# Patient Record
Sex: MALE | Race: WHITE | NOT HISPANIC OR LATINO | URBAN - METROPOLITAN AREA
[De-identification: names, ages, dates, MRNs, and addresses within clinical notes are randomized per-mention and may not be internally consistent; named-entity substitution may affect disease eponyms.]

---

## 2019-03-18 ENCOUNTER — OUTPATIENT (OUTPATIENT)
Dept: OUTPATIENT SERVICES | Facility: HOSPITAL | Age: 64
LOS: 1 days | Discharge: HOME | End: 2019-03-18

## 2019-03-18 DIAGNOSIS — M54.2 CERVICALGIA: ICD-10-CM

## 2019-04-09 PROBLEM — Z00.00 ENCOUNTER FOR PREVENTIVE HEALTH EXAMINATION: Status: ACTIVE | Noted: 2019-04-09

## 2019-05-07 ENCOUNTER — APPOINTMENT (OUTPATIENT)
Dept: SURGERY | Facility: CLINIC | Age: 64
End: 2019-05-07
Payer: COMMERCIAL

## 2019-05-07 VITALS — HEIGHT: 66 IN | BODY MASS INDEX: 30.05 KG/M2 | WEIGHT: 187 LBS

## 2019-05-07 PROCEDURE — 99243 OFF/OP CNSLTJ NEW/EST LOW 30: CPT

## 2019-05-09 NOTE — CONSULT LETTER
[Dear  ___] : Dear  [unfilled], [Please see my note below.] : Please see my note below. [Consult Closing:] : Thank you very much for allowing me to participate in the care of this patient.  If you have any questions, please do not hesitate to contact me. [Courtesy Letter:] : I had the pleasure of seeing your patient, [unfilled], in my office today. [DrFrancis  ___] : Dr. NEWSOME [FreeTextEntry3] : Respectfully,\par \par Bin Rinaldi M.D., FACS\par

## 2019-05-09 NOTE — PHYSICAL EXAM
[Normal Breath Sounds] : Normal breath sounds [No Rash or Lesion] : No rash or lesion [Calm] : calm [Alert] : alert [JVD] : no jugular venous distention  [de-identified] : overweight [de-identified] : normal [de-identified] : mildly protuberant abdomen\par  [de-identified] : normal testicles [de-identified] : large incarcerated right inguinal scrotal hernia

## 2019-05-09 NOTE — ASSESSMENT
[FreeTextEntry1] : Reymundo is a pleasant 64-year-old  with a past medical history significant for hypertension, GERD, glaucoma and degenerative cervical disc disease presenting to the office with his wife Tori (who is a nurse manager at [employer]) with concerns about intermittent pain and swelling in the right groin suspicious for a hernia. He also does heavy lifting and strenuous activity at work.\par \par Physical examination demonstrates a large protuberant bulge in the right groin extending deep into his right hemiscrotum which is not completely reducible consistent with an incarcerated right inguinal scrotal hernia warranting surgical repair. There is no evidence of strangulation and the patient denies any symptoms of obstruction. Examination of his left groin demonstrates some mild weakness but no obvious hernia. Both testicles are normal. His umbilical examination is unremarkable. He is significantly overweight with a current BMI of 30.\par \par I explained the pros and cons of surgery, as well as all risks, benefits, indications and alternatives of the procedure and the patient understood and agreed. He has a family trip scheduled to [facility] in early August and would prefer to undergo his hernia repair prior to that time. Reymundo was scheduled for the repair of his large incarcerated right inguinal scrotal hernia with mesh on Friday, June 7, 2019 under local with IV sedation at [facility] with presurgical testing waived.  The patient was encouraged to avoid heavy lifting and strenuous activity in the interim, of course.\par \par Of note, he had a recent cardiac evaluation due to a family history of coronary disease and his stress echo was normal with a normal ejection fraction. He does take baby aspirin and he may remain on his baby aspirin throughout the perioperative period.

## 2019-05-10 ENCOUNTER — TRANSCRIPTION ENCOUNTER (OUTPATIENT)
Age: 64
End: 2019-05-10

## 2019-06-07 ENCOUNTER — APPOINTMENT (OUTPATIENT)
Dept: SURGERY | Facility: AMBULATORY SURGERY CENTER | Age: 64
End: 2019-06-07
Payer: COMMERCIAL

## 2019-06-07 ENCOUNTER — OUTPATIENT (OUTPATIENT)
Dept: OUTPATIENT SERVICES | Facility: HOSPITAL | Age: 64
LOS: 1 days | Discharge: HOME | End: 2019-06-07

## 2019-06-07 VITALS
SYSTOLIC BLOOD PRESSURE: 131 MMHG | HEIGHT: 66 IN | RESPIRATION RATE: 18 BRPM | WEIGHT: 186.95 LBS | OXYGEN SATURATION: 98 % | TEMPERATURE: 98 F | DIASTOLIC BLOOD PRESSURE: 72 MMHG | HEART RATE: 50 BPM

## 2019-06-07 VITALS
OXYGEN SATURATION: 100 % | HEART RATE: 60 BPM | DIASTOLIC BLOOD PRESSURE: 56 MMHG | RESPIRATION RATE: 18 BRPM | SYSTOLIC BLOOD PRESSURE: 116 MMHG

## 2019-06-07 DIAGNOSIS — Z98.52 VASECTOMY STATUS: Chronic | ICD-10-CM

## 2019-06-07 PROCEDURE — 49507 PRP I/HERN INIT BLOCK >5 YR: CPT | Mod: RT

## 2019-06-07 RX ORDER — TRAMADOL HYDROCHLORIDE 50 MG/1
1 TABLET ORAL
Qty: 30 | Refills: 0
Start: 2019-06-07 | End: 2019-06-11

## 2019-06-07 RX ORDER — HYDROMORPHONE HYDROCHLORIDE 2 MG/ML
0.5 INJECTION INTRAMUSCULAR; INTRAVENOUS; SUBCUTANEOUS
Refills: 0 | Status: DISCONTINUED | OUTPATIENT
Start: 2019-06-07 | End: 2019-06-07

## 2019-06-07 RX ORDER — MORPHINE SULFATE 50 MG/1
2 CAPSULE, EXTENDED RELEASE ORAL
Refills: 0 | Status: DISCONTINUED | OUTPATIENT
Start: 2019-06-07 | End: 2019-06-07

## 2019-06-07 RX ORDER — OXYCODONE AND ACETAMINOPHEN 5; 325 MG/1; MG/1
1 TABLET ORAL ONCE
Refills: 0 | Status: DISCONTINUED | OUTPATIENT
Start: 2019-06-07 | End: 2019-06-07

## 2019-06-07 RX ORDER — ONDANSETRON 8 MG/1
4 TABLET, FILM COATED ORAL ONCE
Refills: 0 | Status: DISCONTINUED | OUTPATIENT
Start: 2019-06-07 | End: 2019-06-22

## 2019-06-07 RX ORDER — SODIUM CHLORIDE 9 MG/ML
1000 INJECTION, SOLUTION INTRAVENOUS
Refills: 0 | Status: DISCONTINUED | OUTPATIENT
Start: 2019-06-07 | End: 2019-06-22

## 2019-06-07 RX ADMIN — SODIUM CHLORIDE 100 MILLILITER(S): 9 INJECTION, SOLUTION INTRAVENOUS at 10:06

## 2019-06-07 NOTE — ASU DISCHARGE PLAN (ADULT/PEDIATRIC) - CALL YOUR DOCTOR IF YOU HAVE ANY OF THE FOLLOWING:
Bleeding that does not stop Bleeding that does not stop/Swelling that gets worse/Unable to urinate/Pain not relieved by Medications/Fever greater than (need to indicate Fahrenheit or Celsius)/Wound/Surgical Site with redness, or foul smelling discharge or pus

## 2019-06-07 NOTE — BRIEF OPERATIVE NOTE - NSICDXBRIEFPROCEDURE_GEN_ALL_CORE_FT
PROCEDURES:  Open repair of incarcerated inguinal hernia using mesh in adult 07-Jun-2019 09:08:12 RIGHT SCROTAL Bin Rinaldi

## 2019-06-07 NOTE — CHART NOTE - NSCHARTNOTEFT_GEN_A_CORE
PACU ANESTHESIA PACU ADMISSION NOTE      Procedure:Open repair of incarcerated inguinal hernia using mesh in adult: RIGHT SCROTAL    Post op diagnosisIncarcerated right inguinal hernia      ____ Intubated  TV:______       Rate: ______      FiO2: ______    ___x_ Patent Airway    ___x_ Full return of protective reflexes    ____ Full recovery from anesthesia / sedation to baseline status    Viitals:  see anesthesia record          Mental Status:  _x___ Awake   _____ Alert   _____ Drowsy   _____ Sedated    Nausea/Vomiting: ____ Yes, See Post - Op Orders      __x__ No    Pain Scale (0-10): _____    Treatment: ____ None    __x__ See Post - Op/PCA Orders    Post - Operative Fluids:   ____ Oral   __x__ See Post - Op Orders    Plan:         Discharge:   _x___Home       _____Floor         _____Critical Care    _____Other:_________________    Comments: uneventful perioperative course; no s/s anesthesia complications; d/c home when criteria met

## 2019-06-07 NOTE — PRE-ANESTHESIA EVALUATION ADULT - NSANTHOSAYNRD_GEN_A_CORE
No. ARRON screening performed.  STOP BANG Legend: 0-2 = LOW Risk; 3-4 = INTERMEDIATE Risk; 5-8 = HIGH Risk

## 2019-06-07 NOTE — ASU DISCHARGE PLAN (ADULT/PEDIATRIC) - CARE PROVIDER_API CALL
Bin Rinaldi)  Surgery  [address] [address]  [place] [address]  Phone: [phone]  Fax: [phone]  Follow Up Time: 1 week

## 2019-06-11 DIAGNOSIS — K40.30 UNILATERAL INGUINAL HERNIA, WITH OBSTRUCTION, WITHOUT GANGRENE, NOT SPECIFIED AS RECURRENT: ICD-10-CM

## 2019-06-11 DIAGNOSIS — E78.00 PURE HYPERCHOLESTEROLEMIA, UNSPECIFIED: ICD-10-CM

## 2019-06-11 DIAGNOSIS — I10 ESSENTIAL (PRIMARY) HYPERTENSION: ICD-10-CM

## 2019-06-11 DIAGNOSIS — E03.9 HYPOTHYROIDISM, UNSPECIFIED: ICD-10-CM

## 2019-06-11 DIAGNOSIS — K21.9 GASTRO-ESOPHAGEAL REFLUX DISEASE WITHOUT ESOPHAGITIS: ICD-10-CM

## 2019-06-11 DIAGNOSIS — Z88.0 ALLERGY STATUS TO PENICILLIN: ICD-10-CM

## 2019-06-18 ENCOUNTER — APPOINTMENT (OUTPATIENT)
Dept: SURGERY | Facility: CLINIC | Age: 64
End: 2019-06-18
Payer: COMMERCIAL

## 2019-06-18 DIAGNOSIS — K40.30 UNILATERAL INGUINAL HERNIA, WITH OBSTRUCTION, W/OUT GANGRENE, NOT SPECIFIED AS RECURRENT: ICD-10-CM

## 2019-06-18 PROCEDURE — 99024 POSTOP FOLLOW-UP VISIT: CPT

## 2019-06-18 NOTE — ASSESSMENT
[FreeTextEntry1] : Reymundo underwent the repair of his very large indirect incarcerated right inguinal scrotal hernia with mesh on June 7, 2019 under local with IV sedation without any problems or complications. His wound is clean, dry and intact. There is no evidence of erythema, seroma formation or infection. Surprisingly, he has minimal scrotal swelling. He is tolerating a diet and having normal bowel movements. He denies any significant postoperative pain or discomfort at this time.\par \par Reymundo and his wife Tori were counseled and reassured. He was discharged from the office with no specific followup necessary, but he knows to avoid any heavy lifting or strenuous activity for the next several weeks. We also discussed the importance of calorie restriction and healthy eating with regard to weight loss, hernia recurrence and one's overall health.

## 2019-06-18 NOTE — CONSULT LETTER
[FreeTextEntry1] : Dear Dr. Kaushal Hoyt, \par \par I had the pleasure of seeing your patient, MELBA MOON, in my office today. Please see my note below. \par \par Thank you very much for allowing me to participate in the care of this patient. If you have any questions, please do not hesitate to contact me. \par \par \par Respectfully,\par \par Bin Rinaldi M.D., FACS\par  \par \par \par cc: Dr. Bin Steele

## 2020-01-04 ENCOUNTER — INPATIENT (INPATIENT)
Facility: HOSPITAL | Age: 65
LOS: 1 days | Discharge: HOME | End: 2020-01-06
Attending: INTERNAL MEDICINE | Admitting: INTERNAL MEDICINE
Payer: COMMERCIAL

## 2020-01-04 VITALS
SYSTOLIC BLOOD PRESSURE: 125 MMHG | OXYGEN SATURATION: 97 % | RESPIRATION RATE: 18 BRPM | TEMPERATURE: 98 F | DIASTOLIC BLOOD PRESSURE: 76 MMHG | HEART RATE: 81 BPM

## 2020-01-04 DIAGNOSIS — Z98.52 VASECTOMY STATUS: Chronic | ICD-10-CM

## 2020-01-04 LAB
ALBUMIN SERPL ELPH-MCNC: 4.6 G/DL — SIGNIFICANT CHANGE UP (ref 3.5–5.2)
ALP SERPL-CCNC: 36 U/L — SIGNIFICANT CHANGE UP (ref 30–115)
ALT FLD-CCNC: 13 U/L — SIGNIFICANT CHANGE UP (ref 0–41)
ANION GAP SERPL CALC-SCNC: 22 MMOL/L — HIGH (ref 7–14)
APTT BLD: 26.8 SEC — LOW (ref 27–39.2)
AST SERPL-CCNC: 18 U/L — SIGNIFICANT CHANGE UP (ref 0–41)
BASOPHILS # BLD AUTO: 0.05 K/UL — SIGNIFICANT CHANGE UP (ref 0–0.2)
BASOPHILS NFR BLD AUTO: 0.6 % — SIGNIFICANT CHANGE UP (ref 0–1)
BILIRUB SERPL-MCNC: 0.7 MG/DL — SIGNIFICANT CHANGE UP (ref 0.2–1.2)
BUN SERPL-MCNC: 24 MG/DL — HIGH (ref 10–20)
CALCIUM SERPL-MCNC: 9.8 MG/DL — SIGNIFICANT CHANGE UP (ref 8.5–10.1)
CHLORIDE SERPL-SCNC: 95 MMOL/L — LOW (ref 98–110)
CO2 SERPL-SCNC: 21 MMOL/L — SIGNIFICANT CHANGE UP (ref 17–32)
CREAT SERPL-MCNC: 1.1 MG/DL — SIGNIFICANT CHANGE UP (ref 0.7–1.5)
EOSINOPHIL # BLD AUTO: 0.14 K/UL — SIGNIFICANT CHANGE UP (ref 0–0.7)
EOSINOPHIL NFR BLD AUTO: 1.8 % — SIGNIFICANT CHANGE UP (ref 0–8)
GLUCOSE SERPL-MCNC: 115 MG/DL — HIGH (ref 70–99)
HCT VFR BLD CALC: 39.2 % — LOW (ref 42–52)
HGB BLD-MCNC: 13.6 G/DL — LOW (ref 14–18)
IMM GRANULOCYTES NFR BLD AUTO: 0.4 % — HIGH (ref 0.1–0.3)
INR BLD: 0.99 RATIO — SIGNIFICANT CHANGE UP (ref 0.65–1.3)
LACTATE SERPL-SCNC: 2.5 MMOL/L — HIGH (ref 0.7–2)
LYMPHOCYTES # BLD AUTO: 1.79 K/UL — SIGNIFICANT CHANGE UP (ref 1.2–3.4)
LYMPHOCYTES # BLD AUTO: 22.9 % — SIGNIFICANT CHANGE UP (ref 20.5–51.1)
MCHC RBC-ENTMCNC: 32.2 PG — HIGH (ref 27–31)
MCHC RBC-ENTMCNC: 34.7 G/DL — SIGNIFICANT CHANGE UP (ref 32–37)
MCV RBC AUTO: 92.9 FL — SIGNIFICANT CHANGE UP (ref 80–94)
MONOCYTES # BLD AUTO: 0.45 K/UL — SIGNIFICANT CHANGE UP (ref 0.1–0.6)
MONOCYTES NFR BLD AUTO: 5.8 % — SIGNIFICANT CHANGE UP (ref 1.7–9.3)
NEUTROPHILS # BLD AUTO: 5.36 K/UL — SIGNIFICANT CHANGE UP (ref 1.4–6.5)
NEUTROPHILS NFR BLD AUTO: 68.5 % — SIGNIFICANT CHANGE UP (ref 42.2–75.2)
NRBC # BLD: 0 /100 WBCS — SIGNIFICANT CHANGE UP (ref 0–0)
PLATELET # BLD AUTO: 190 K/UL — SIGNIFICANT CHANGE UP (ref 130–400)
POTASSIUM SERPL-MCNC: 3.4 MMOL/L — LOW (ref 3.5–5)
POTASSIUM SERPL-SCNC: 3.4 MMOL/L — LOW (ref 3.5–5)
PROT SERPL-MCNC: 7.1 G/DL — SIGNIFICANT CHANGE UP (ref 6–8)
PROTHROM AB SERPL-ACNC: 11.4 SEC — SIGNIFICANT CHANGE UP (ref 9.95–12.87)
RBC # BLD: 4.22 M/UL — LOW (ref 4.7–6.1)
RBC # FLD: 11.6 % — SIGNIFICANT CHANGE UP (ref 11.5–14.5)
SODIUM SERPL-SCNC: 138 MMOL/L — SIGNIFICANT CHANGE UP (ref 135–146)
TROPONIN T SERPL-MCNC: <0.01 NG/ML — SIGNIFICANT CHANGE UP
WBC # BLD: 7.82 K/UL — SIGNIFICANT CHANGE UP (ref 4.8–10.8)
WBC # FLD AUTO: 7.82 K/UL — SIGNIFICANT CHANGE UP (ref 4.8–10.8)

## 2020-01-04 PROCEDURE — 93010 ELECTROCARDIOGRAM REPORT: CPT | Mod: 76

## 2020-01-04 PROCEDURE — 71045 X-RAY EXAM CHEST 1 VIEW: CPT | Mod: 26

## 2020-01-04 PROCEDURE — 99285 EMERGENCY DEPT VISIT HI MDM: CPT

## 2020-01-04 RX ORDER — SODIUM CHLORIDE 9 MG/ML
2000 INJECTION INTRAMUSCULAR; INTRAVENOUS; SUBCUTANEOUS ONCE
Refills: 0 | Status: COMPLETED | OUTPATIENT
Start: 2020-01-04 | End: 2020-01-04

## 2020-01-04 RX ORDER — POTASSIUM CHLORIDE 20 MEQ
20 PACKET (EA) ORAL ONCE
Refills: 0 | Status: COMPLETED | OUTPATIENT
Start: 2020-01-04 | End: 2020-01-04

## 2020-01-04 RX ADMIN — Medication 20 MILLIEQUIVALENT(S): at 23:45

## 2020-01-04 RX ADMIN — SODIUM CHLORIDE 1000 MILLILITER(S): 9 INJECTION INTRAMUSCULAR; INTRAVENOUS; SUBCUTANEOUS at 23:39

## 2020-01-04 NOTE — ED ADULT NURSE NOTE - CHIEF COMPLAINT QUOTE
BIBA with complaints of syncopal episode, witnessed by wife Cervical strain, acute, initial encounter

## 2020-01-04 NOTE — ED PROVIDER NOTE - CLINICAL SUMMARY MEDICAL DECISION MAKING FREE TEXT BOX
Patient was signed out to me (Dr. Mccormack) by Dr. Marin. 65yo M presents to ED s/p episode of unresponsiveness, description consistent with syncope in context of marijuana use. Pt has had similar in the past, was not evaluated at that time. Initial ED workup unremarkable including labs, CXR, EKG, CTH. Admitted for further eval of symptoms.

## 2020-01-04 NOTE — ED ADULT NURSE NOTE - OBJECTIVE STATEMENT
Wife reports pt had syncopal episode while in a chair, with unresponsiveness for approximately 20-30 seconds, she hit him on the chest a few time then he became responsive again, and was diaphoretic. Pt reports using marijuana tonight, reports he felt "high" before the episode, denies feeling chest pain, nausea, vomiting, sob before the episodes and only reports feeling sweaty after. Pt denies any complaints now. Labs drawn, IV established, and pt placed on continuous cardiac monitor with sinus rhythm.

## 2020-01-05 PROBLEM — E78.00 PURE HYPERCHOLESTEROLEMIA, UNSPECIFIED: Chronic | Status: ACTIVE | Noted: 2019-06-07

## 2020-01-05 PROBLEM — K21.9 GASTRO-ESOPHAGEAL REFLUX DISEASE WITHOUT ESOPHAGITIS: Chronic | Status: ACTIVE | Noted: 2019-06-07

## 2020-01-05 PROBLEM — E03.9 HYPOTHYROIDISM, UNSPECIFIED: Chronic | Status: ACTIVE | Noted: 2019-06-07

## 2020-01-05 PROBLEM — I10 ESSENTIAL (PRIMARY) HYPERTENSION: Chronic | Status: ACTIVE | Noted: 2019-06-07

## 2020-01-05 LAB
ALBUMIN SERPL ELPH-MCNC: 4.5 G/DL — SIGNIFICANT CHANGE UP (ref 3.5–5.2)
ALP SERPL-CCNC: 36 U/L — SIGNIFICANT CHANGE UP (ref 30–115)
ALT FLD-CCNC: 13 U/L — SIGNIFICANT CHANGE UP (ref 0–41)
ANION GAP SERPL CALC-SCNC: 19 MMOL/L — HIGH (ref 7–14)
AST SERPL-CCNC: 17 U/L — SIGNIFICANT CHANGE UP (ref 0–41)
BASE EXCESS BLDV CALC-SCNC: -1.7 MMOL/L — SIGNIFICANT CHANGE UP (ref -2–2)
BASOPHILS # BLD AUTO: 0.05 K/UL — SIGNIFICANT CHANGE UP (ref 0–0.2)
BASOPHILS NFR BLD AUTO: 0.9 % — SIGNIFICANT CHANGE UP (ref 0–1)
BILIRUB SERPL-MCNC: 0.9 MG/DL — SIGNIFICANT CHANGE UP (ref 0.2–1.2)
BUN SERPL-MCNC: 22 MG/DL — HIGH (ref 10–20)
CA-I SERPL-SCNC: 1.18 MMOL/L — SIGNIFICANT CHANGE UP (ref 1.12–1.3)
CALCIUM SERPL-MCNC: 9 MG/DL — SIGNIFICANT CHANGE UP (ref 8.5–10.1)
CHLORIDE SERPL-SCNC: 99 MMOL/L — SIGNIFICANT CHANGE UP (ref 98–110)
CO2 SERPL-SCNC: 20 MMOL/L — SIGNIFICANT CHANGE UP (ref 17–32)
CREAT SERPL-MCNC: 1.1 MG/DL — SIGNIFICANT CHANGE UP (ref 0.7–1.5)
EOSINOPHIL # BLD AUTO: 0.09 K/UL — SIGNIFICANT CHANGE UP (ref 0–0.7)
EOSINOPHIL NFR BLD AUTO: 1.6 % — SIGNIFICANT CHANGE UP (ref 0–8)
GAS PNL BLDV: 140 MMOL/L — SIGNIFICANT CHANGE UP (ref 136–145)
GAS PNL BLDV: SIGNIFICANT CHANGE UP
GLUCOSE SERPL-MCNC: 147 MG/DL — HIGH (ref 70–99)
HCO3 BLDV-SCNC: 22 MMOL/L — SIGNIFICANT CHANGE UP (ref 22–29)
HCT VFR BLD CALC: 36 % — LOW (ref 42–52)
HCT VFR BLDA CALC: 37.5 % — SIGNIFICANT CHANGE UP (ref 34–44)
HGB BLD CALC-MCNC: 12.2 G/DL — LOW (ref 14–18)
HGB BLD-MCNC: 12.3 G/DL — LOW (ref 14–18)
IMM GRANULOCYTES NFR BLD AUTO: 0.4 % — HIGH (ref 0.1–0.3)
LACTATE BLDV-MCNC: 1 MMOL/L — SIGNIFICANT CHANGE UP (ref 0.5–1.6)
LACTATE SERPL-SCNC: 1.3 MMOL/L — SIGNIFICANT CHANGE UP (ref 0.7–2)
LYMPHOCYTES # BLD AUTO: 1.03 K/UL — LOW (ref 1.2–3.4)
LYMPHOCYTES # BLD AUTO: 18.4 % — LOW (ref 20.5–51.1)
MAGNESIUM SERPL-MCNC: 1.5 MG/DL — LOW (ref 1.8–2.4)
MCHC RBC-ENTMCNC: 31.9 PG — HIGH (ref 27–31)
MCHC RBC-ENTMCNC: 34.2 G/DL — SIGNIFICANT CHANGE UP (ref 32–37)
MCV RBC AUTO: 93.3 FL — SIGNIFICANT CHANGE UP (ref 80–94)
MONOCYTES # BLD AUTO: 0.41 K/UL — SIGNIFICANT CHANGE UP (ref 0.1–0.6)
MONOCYTES NFR BLD AUTO: 7.3 % — SIGNIFICANT CHANGE UP (ref 1.7–9.3)
NEUTROPHILS # BLD AUTO: 3.99 K/UL — SIGNIFICANT CHANGE UP (ref 1.4–6.5)
NEUTROPHILS NFR BLD AUTO: 71.4 % — SIGNIFICANT CHANGE UP (ref 42.2–75.2)
NRBC # BLD: 0 /100 WBCS — SIGNIFICANT CHANGE UP (ref 0–0)
PCO2 BLDV: 35 MMHG — LOW (ref 41–51)
PH BLDV: 7.42 — SIGNIFICANT CHANGE UP (ref 7.26–7.43)
PLATELET # BLD AUTO: 187 K/UL — SIGNIFICANT CHANGE UP (ref 130–400)
PO2 BLDV: 49 MMHG — HIGH (ref 20–40)
POTASSIUM BLDV-SCNC: 3.8 MMOL/L — SIGNIFICANT CHANGE UP (ref 3.3–5.6)
POTASSIUM SERPL-MCNC: 3.9 MMOL/L — SIGNIFICANT CHANGE UP (ref 3.5–5)
POTASSIUM SERPL-SCNC: 3.9 MMOL/L — SIGNIFICANT CHANGE UP (ref 3.5–5)
PROT SERPL-MCNC: 6.7 G/DL — SIGNIFICANT CHANGE UP (ref 6–8)
RBC # BLD: 3.86 M/UL — LOW (ref 4.7–6.1)
RBC # FLD: 11.6 % — SIGNIFICANT CHANGE UP (ref 11.5–14.5)
SAO2 % BLDV: 85 % — SIGNIFICANT CHANGE UP
SODIUM SERPL-SCNC: 138 MMOL/L — SIGNIFICANT CHANGE UP (ref 135–146)
TROPONIN T SERPL-MCNC: <0.01 NG/ML — SIGNIFICANT CHANGE UP
TROPONIN T SERPL-MCNC: <0.01 NG/ML — SIGNIFICANT CHANGE UP
WBC # BLD: 5.59 K/UL — SIGNIFICANT CHANGE UP (ref 4.8–10.8)
WBC # FLD AUTO: 5.59 K/UL — SIGNIFICANT CHANGE UP (ref 4.8–10.8)

## 2020-01-05 PROCEDURE — 70450 CT HEAD/BRAIN W/O DYE: CPT | Mod: 26

## 2020-01-05 PROCEDURE — 93010 ELECTROCARDIOGRAM REPORT: CPT

## 2020-01-05 RX ORDER — MAGNESIUM SULFATE 500 MG/ML
2 VIAL (ML) INJECTION ONCE
Refills: 0 | Status: COMPLETED | OUTPATIENT
Start: 2020-01-05 | End: 2020-01-05

## 2020-01-05 RX ORDER — ENOXAPARIN SODIUM 100 MG/ML
40 INJECTION SUBCUTANEOUS DAILY
Refills: 0 | Status: DISCONTINUED | OUTPATIENT
Start: 2020-01-05 | End: 2020-01-06

## 2020-01-05 RX ORDER — HYDROCHLOROTHIAZIDE 25 MG
12.5 TABLET ORAL DAILY
Refills: 0 | Status: DISCONTINUED | OUTPATIENT
Start: 2020-01-05 | End: 2020-01-06

## 2020-01-05 RX ORDER — OLMESARTAN MEDOXOMIL-HYDROCHLOROTHIAZIDE 25; 40 MG/1; MG/1
1 TABLET, FILM COATED ORAL DAILY
Refills: 0 | Status: DISCONTINUED | OUTPATIENT
Start: 2020-01-06 | End: 2020-01-06

## 2020-01-05 RX ORDER — TIMOLOL 0.5 %
1 DROPS OPHTHALMIC (EYE)
Refills: 0 | Status: DISCONTINUED | OUTPATIENT
Start: 2020-01-05 | End: 2020-01-06

## 2020-01-05 RX ORDER — LEVOTHYROXINE SODIUM 125 MCG
175 TABLET ORAL DAILY
Refills: 0 | Status: DISCONTINUED | OUTPATIENT
Start: 2020-01-05 | End: 2020-01-06

## 2020-01-05 RX ORDER — LEVOTHYROXINE SODIUM 125 MCG
1 TABLET ORAL
Qty: 0 | Refills: 0 | DISCHARGE

## 2020-01-05 RX ORDER — TIMOLOL 0.5 %
1 DROPS OPHTHALMIC (EYE)
Qty: 0 | Refills: 0 | DISCHARGE

## 2020-01-05 RX ORDER — BIMATOPROST 0.3 MG/ML
1 SOLUTION/ DROPS OPHTHALMIC
Qty: 0 | Refills: 0 | DISCHARGE

## 2020-01-05 RX ORDER — ASPIRIN/CALCIUM CARB/MAGNESIUM 324 MG
81 TABLET ORAL DAILY
Refills: 0 | Status: DISCONTINUED | OUTPATIENT
Start: 2020-01-05 | End: 2020-01-06

## 2020-01-05 RX ORDER — PANTOPRAZOLE SODIUM 20 MG/1
40 TABLET, DELAYED RELEASE ORAL
Refills: 0 | Status: DISCONTINUED | OUTPATIENT
Start: 2020-01-05 | End: 2020-01-06

## 2020-01-05 RX ORDER — LATANOPROST 0.05 MG/ML
1 SOLUTION/ DROPS OPHTHALMIC; TOPICAL AT BEDTIME
Refills: 0 | Status: DISCONTINUED | OUTPATIENT
Start: 2020-01-05 | End: 2020-01-06

## 2020-01-05 RX ORDER — ATORVASTATIN CALCIUM 80 MG/1
1 TABLET, FILM COATED ORAL
Qty: 0 | Refills: 0 | DISCHARGE

## 2020-01-05 RX ORDER — ATORVASTATIN CALCIUM 80 MG/1
40 TABLET, FILM COATED ORAL AT BEDTIME
Refills: 0 | Status: DISCONTINUED | OUTPATIENT
Start: 2020-01-05 | End: 2020-01-06

## 2020-01-05 RX ORDER — OLMESARTAN MEDOXOMIL-HYDROCHLOROTHIAZIDE 25; 40 MG/1; MG/1
1 TABLET, FILM COATED ORAL
Qty: 0 | Refills: 0 | DISCHARGE

## 2020-01-05 RX ORDER — LOSARTAN POTASSIUM 100 MG/1
100 TABLET, FILM COATED ORAL DAILY
Refills: 0 | Status: DISCONTINUED | OUTPATIENT
Start: 2020-01-05 | End: 2020-01-06

## 2020-01-05 RX ADMIN — Medication 25 GRAM(S): at 16:50

## 2020-01-05 RX ADMIN — Medication 1 DROP(S): at 09:30

## 2020-01-05 RX ADMIN — PANTOPRAZOLE SODIUM 40 MILLIGRAM(S): 20 TABLET, DELAYED RELEASE ORAL at 06:34

## 2020-01-05 RX ADMIN — Medication 81 MILLIGRAM(S): at 12:24

## 2020-01-05 RX ADMIN — LOSARTAN POTASSIUM 100 MILLIGRAM(S): 100 TABLET, FILM COATED ORAL at 06:34

## 2020-01-05 RX ADMIN — ATORVASTATIN CALCIUM 40 MILLIGRAM(S): 80 TABLET, FILM COATED ORAL at 22:29

## 2020-01-05 RX ADMIN — Medication 12.5 MILLIGRAM(S): at 06:34

## 2020-01-05 RX ADMIN — ENOXAPARIN SODIUM 40 MILLIGRAM(S): 100 INJECTION SUBCUTANEOUS at 12:24

## 2020-01-05 RX ADMIN — Medication 175 MICROGRAM(S): at 06:34

## 2020-01-05 NOTE — H&P ADULT - NSHPPHYSICALEXAM_GEN_ALL_CORE
GENERAL: NAD, lying in bed comfortably  HEAD:  Atraumatic, Normocephalic  EYES: EOMI, PERRLA, conjunctiva and sclera clear  ENT: Moist mucous membranes  NECK: Supple, No JVD  CHEST/LUNG: Clear to auscultation bilaterally; No rales, rhonchi, wheezing, or rubs. Unlabored respirations  HEART: Regular rate and rhythm; No murmurs, rubs, or gallops  ABDOMEN: Bowel sounds present; Soft, Nontender, Nondistended. No hepatomegaly  EXTREMITIES:  + Peripheral Pulses, brisk capillary refill. No clubbing, cyanosis, or edema  NERVOUS SYSTEM:  Alert & Oriented X3, speech clear. No deficits   MSK: FROM all 4 extremities, full and equal strength  SKIN: No rashes or lesions

## 2020-01-05 NOTE — H&P ADULT - ASSESSMENT
63 y/o M with pmh of HTN, DLD, Dyslipidemia, GERD, hypothyroid presents with c/o Unresponsiveness for less than a minute. As per the wife who is a RN in this hospital, pt had some weed today, it was "strong stuff" and half an hour later the pt became unresponsive and diaphoretic.    # Episode of unresponsiveness after marijuana use  - pt does have some risk factors HTN, DLD, Hyothyroid  - check orthostatic VS, c/w tele  - CT Head -ve, check REEG as lactate was 2.5, f/u TSH, RPR  - pt can get 2D echo and carotid duplex done outpt  - potassium was 3.4 doubt arrythmia as the cause of this event, c/w tele for now  - pt counselled to stop smoking marijuana.    # Hypokalemia  - repleted f/u repeat in am    # HTN  - c/w losartan, HCTZ monitor lytes and renal function    # DLD  - c/w statins    # Hypotyroidism  - c/w synthroid, f/u TSH    DVT PPx: Lovenox 40 mg s/c  GI PPX: not indicated  Diet: DASH  Activity: Increase as tolerated  Dispo: from home, no needs, possible d/c in am if w/up is -ve  Code Status: full code

## 2020-01-05 NOTE — H&P ADULT - NSHPSOCIALHISTORY_GEN_ALL_CORE
denied hx of smoking, or alcohol use.  smokes marijuana every now and then, denied use of any other drugs.

## 2020-01-05 NOTE — H&P ADULT - NSHPLABSRESULTS_GEN_ALL_CORE
13.6   7.82  )-----------( 190      ( 04 Jan 2020 21:44 )             39.2       01-04    138  |  95<L>  |  24<H>  ----------------------------<  115<H>  3.4<L>   |  21  |  1.1    Ca    9.8      04 Jan 2020 21:44    TPro  7.1  /  Alb  4.6  /  TBili  0.7  /  DBili  x   /  AST  18  /  ALT  13  /  AlkPhos  36  01-04                  PT/INR - ( 04 Jan 2020 21:44 )   PT: 11.40 sec;   INR: 0.99 ratio         PTT - ( 04 Jan 2020 21:44 )  PTT:26.8 sec    Lactate Trend  01-04 @ 21:44 Lactate:2.5       CARDIAC MARKERS ( 04 Jan 2020 21:44 )  x     / <0.01 ng/mL / x     / x     / x            CAPILLARY BLOOD GLUCOSE        < from: CT Head No Cont (01.05.20 @ 02:27) >      Impression:     No CT evidence for acute intracranial pathology    < end of copied text >

## 2020-01-05 NOTE — H&P ADULT - HISTORY OF PRESENT ILLNESS
65 y/o M with pmh of HTN, DLD, Dyslipidemia, GERD, hypothyroid presents with c/o Unresponsiveness for less than a minute. As per the wife who is a RN in this hospital, pt had some weed today, it was "strong stuff" and half an hour later the pt became unresponsive and diaphoretic. It lasted for less than a minute but then pt had another similar episode so the family called EMS and pt was brought to ED. Pt said that he kind of dozed off and he does not remember what happened but he did not have any symptoms of chest pain, palpitations, lightheadedness, vertigo, urinary or fecal incontinence, or confusion after the episode. Pt attributes his symptoms to marijuana use that he started recently after nursing home. Pt had recent episodes of diarrhea with post nasal drip but now his symptoms are improving. Pt denied any hx of fever, chills, nausea, vomiting, constipation, melena, pain in abdomen, sob, chest pain, cough, increased urinary frequency, dysuria, headache, any changes in weight, recent travel, lightheadedness, dizziness, vertigo, localized weakness or numbness/tingling.

## 2020-01-05 NOTE — H&P ADULT - NSICDXPASTMEDICALHX_GEN_ALL_CORE_FT
PAST MEDICAL HISTORY:  GERD (gastroesophageal reflux disease)     High cholesterol     Hypertension     Hypothyroidism

## 2020-01-06 ENCOUNTER — TRANSCRIPTION ENCOUNTER (OUTPATIENT)
Age: 65
End: 2020-01-06

## 2020-01-06 VITALS
SYSTOLIC BLOOD PRESSURE: 132 MMHG | DIASTOLIC BLOOD PRESSURE: 76 MMHG | TEMPERATURE: 96 F | RESPIRATION RATE: 19 BRPM | HEART RATE: 50 BPM

## 2020-01-06 LAB
AMPHET UR-MCNC: NEGATIVE — SIGNIFICANT CHANGE UP
ANION GAP SERPL CALC-SCNC: 13 MMOL/L — SIGNIFICANT CHANGE UP (ref 7–14)
APPEARANCE UR: CLEAR — SIGNIFICANT CHANGE UP
BARBITURATES UR SCN-MCNC: NEGATIVE — SIGNIFICANT CHANGE UP
BASOPHILS # BLD AUTO: 0.04 K/UL — SIGNIFICANT CHANGE UP (ref 0–0.2)
BASOPHILS NFR BLD AUTO: 0.9 % — SIGNIFICANT CHANGE UP (ref 0–1)
BENZODIAZ UR-MCNC: NEGATIVE — SIGNIFICANT CHANGE UP
BILIRUB UR-MCNC: NEGATIVE — SIGNIFICANT CHANGE UP
BUN SERPL-MCNC: 22 MG/DL — HIGH (ref 10–20)
CALCIUM SERPL-MCNC: 9 MG/DL — SIGNIFICANT CHANGE UP (ref 8.5–10.1)
CHLORIDE SERPL-SCNC: 103 MMOL/L — SIGNIFICANT CHANGE UP (ref 98–110)
CO2 SERPL-SCNC: 25 MMOL/L — SIGNIFICANT CHANGE UP (ref 17–32)
COCAINE METAB.OTHER UR-MCNC: NEGATIVE — SIGNIFICANT CHANGE UP
COLOR SPEC: SIGNIFICANT CHANGE UP
CREAT SERPL-MCNC: 1.3 MG/DL — SIGNIFICANT CHANGE UP (ref 0.7–1.5)
DIFF PNL FLD: NEGATIVE — SIGNIFICANT CHANGE UP
EOSINOPHIL # BLD AUTO: 0.16 K/UL — SIGNIFICANT CHANGE UP (ref 0–0.7)
EOSINOPHIL NFR BLD AUTO: 3.4 % — SIGNIFICANT CHANGE UP (ref 0–8)
GLUCOSE SERPL-MCNC: 126 MG/DL — HIGH (ref 70–99)
GLUCOSE UR QL: NEGATIVE — SIGNIFICANT CHANGE UP
HCT VFR BLD CALC: 35 % — LOW (ref 42–52)
HGB BLD-MCNC: 12 G/DL — LOW (ref 14–18)
IMM GRANULOCYTES NFR BLD AUTO: 0.4 % — HIGH (ref 0.1–0.3)
KETONES UR-MCNC: NEGATIVE — SIGNIFICANT CHANGE UP
LEUKOCYTE ESTERASE UR-ACNC: NEGATIVE — SIGNIFICANT CHANGE UP
LYMPHOCYTES # BLD AUTO: 1.32 K/UL — SIGNIFICANT CHANGE UP (ref 1.2–3.4)
LYMPHOCYTES # BLD AUTO: 28.3 % — SIGNIFICANT CHANGE UP (ref 20.5–51.1)
MAGNESIUM SERPL-MCNC: 2 MG/DL — SIGNIFICANT CHANGE UP (ref 1.8–2.4)
MCHC RBC-ENTMCNC: 31.9 PG — HIGH (ref 27–31)
MCHC RBC-ENTMCNC: 34.3 G/DL — SIGNIFICANT CHANGE UP (ref 32–37)
MCV RBC AUTO: 93.1 FL — SIGNIFICANT CHANGE UP (ref 80–94)
METHADONE UR-MCNC: NEGATIVE — SIGNIFICANT CHANGE UP
MONOCYTES # BLD AUTO: 0.36 K/UL — SIGNIFICANT CHANGE UP (ref 0.1–0.6)
MONOCYTES NFR BLD AUTO: 7.7 % — SIGNIFICANT CHANGE UP (ref 1.7–9.3)
NEUTROPHILS # BLD AUTO: 2.76 K/UL — SIGNIFICANT CHANGE UP (ref 1.4–6.5)
NEUTROPHILS NFR BLD AUTO: 59.3 % — SIGNIFICANT CHANGE UP (ref 42.2–75.2)
NITRITE UR-MCNC: NEGATIVE — SIGNIFICANT CHANGE UP
NRBC # BLD: 0 /100 WBCS — SIGNIFICANT CHANGE UP (ref 0–0)
OPIATES UR-MCNC: NEGATIVE — SIGNIFICANT CHANGE UP
PCP SPEC-MCNC: SIGNIFICANT CHANGE UP
PH UR: 5.5 — SIGNIFICANT CHANGE UP (ref 5–8)
PLATELET # BLD AUTO: 170 K/UL — SIGNIFICANT CHANGE UP (ref 130–400)
POTASSIUM SERPL-MCNC: 4.4 MMOL/L — SIGNIFICANT CHANGE UP (ref 3.5–5)
POTASSIUM SERPL-SCNC: 4.4 MMOL/L — SIGNIFICANT CHANGE UP (ref 3.5–5)
PROPOXYPHENE QUALITATIVE URINE RESULT: NEGATIVE — SIGNIFICANT CHANGE UP
PROT UR-MCNC: NEGATIVE — SIGNIFICANT CHANGE UP
RBC # BLD: 3.76 M/UL — LOW (ref 4.7–6.1)
RBC # FLD: 11.7 % — SIGNIFICANT CHANGE UP (ref 11.5–14.5)
SODIUM SERPL-SCNC: 141 MMOL/L — SIGNIFICANT CHANGE UP (ref 135–146)
SP GR SPEC: 1.02 — SIGNIFICANT CHANGE UP (ref 1.01–1.02)
TSH SERPL-MCNC: 1.57 UIU/ML — SIGNIFICANT CHANGE UP (ref 0.27–4.2)
UROBILINOGEN FLD QL: SIGNIFICANT CHANGE UP
WBC # BLD: 4.66 K/UL — LOW (ref 4.8–10.8)
WBC # FLD AUTO: 4.66 K/UL — LOW (ref 4.8–10.8)

## 2020-01-06 RX ORDER — ASPIRIN/CALCIUM CARB/MAGNESIUM 324 MG
1 TABLET ORAL
Qty: 0 | Refills: 0 | DISCHARGE

## 2020-01-06 RX ADMIN — PANTOPRAZOLE SODIUM 40 MILLIGRAM(S): 20 TABLET, DELAYED RELEASE ORAL at 06:32

## 2020-01-06 RX ADMIN — Medication 1 DROP(S): at 06:32

## 2020-01-06 RX ADMIN — Medication 175 MICROGRAM(S): at 06:32

## 2020-01-06 NOTE — DISCHARGE NOTE PROVIDER - NSDCMRMEDTOKEN_GEN_ALL_CORE_FT
Benicar HCT 40 mg-12.5 mg oral tablet: 1 tab(s) orally once a day  Lipitor 40 mg oral tablet: 1 tab(s) orally once a day  Low Dose ASA 81 mg oral tablet: 1 tab(s) orally once a day  Lumigan 0.01% ophthalmic solution: 1 drop(s) to each affected eye once a day (in the evening)  omeprazole 20 mg oral delayed release tablet: 1 tab(s) orally once a day  Synthroid 175 mcg (0.175 mg) oral tablet: 1 tab(s) orally once a day  timolol hemihydrate 0.5% ophthalmic solution: 1 drop(s) to each affected eye 2 times a day Benicar HCT 40 mg-12.5 mg oral tablet: 1 tab(s) orally once a day  Lipitor 40 mg oral tablet: 1 tab(s) orally once a day  Lumigan 0.01% ophthalmic solution: 1 drop(s) to each affected eye once a day (in the evening)  omeprazole 20 mg oral delayed release tablet: 1 tab(s) orally once a day  Synthroid 175 mcg (0.175 mg) oral tablet: 1 tab(s) orally once a day  timolol hemihydrate 0.5% ophthalmic solution: 1 drop(s) to each affected eye 2 times a day

## 2020-01-06 NOTE — DISCHARGE NOTE NURSING/CASE MANAGEMENT/SOCIAL WORK - PATIENT PORTAL LINK FT
You can access the FollowMyHealth Patient Portal offered by [facility] by registering at the following website: [email or web address]. By joining [employer]’s FollowMyHealth portal, you will also be able to view your health information using other applications (apps) compatible with our system.

## 2020-01-06 NOTE — CONSULT NOTE ADULT - ASSESSMENT
1] Syncope - etiology probably due to Cannabis use  - No evidence of arrhythmia on telemetry disclosure  - No neurologic deficits  - CTSCAN Brain results noted.  - No further cardiac workup warrated    2] Hypertension  - Continue medications    3] Dyslipidemia  - Continue statin therapy    DVT prophylaxis    May go home from cardiac standpoint.  Plan discussed with House Staff and Nursing Staff    Plan discussed with patient and family at bedside.  He agrees with plan.    Bin Steele MD  [phone] Office

## 2020-01-06 NOTE — DISCHARGE NOTE PROVIDER - NSDCCPCAREPLAN_GEN_ALL_CORE_FT
PRINCIPAL DISCHARGE DIAGNOSIS  Diagnosis: Syncope  Assessment and Plan of Treatment: You presented to the ED for breif episode of non-responsiveness. CT scan did not show any pathology and cardiology have also cleared you for the discharge. This episode most likely occured because of cannabanoid use.  You are strongly advised to stop cannabanoid use in the future.      SECONDARY DISCHARGE DIAGNOSES  Diagnosis: Hypertension  Assessment and Plan of Treatment: continue taking your blood pressure pills. Check your blood pressure daily. I fits uncontrolled, F/U with your primary doctor

## 2020-01-06 NOTE — DISCHARGE NOTE PROVIDER - HOSPITAL COURSE
65 y/o M with pmh of HTN, DLD, Dyslipidemia, GERD, hypothyroid presents with c/o Unresponsiveness for less than a minute. As per the wife who is a RN in this hospital, pt had some weed today, it was "strong stuff" and half an hour later the pt became unresponsive and diaphoretic. It lasted for less than a minute but then pt had another similar episode so the family called EMS and pt was brought to ED. Pt said that he kind of dozed off and he does not remember what happened but he did not have any symptoms of chest pain, palpitations, lightheadedness, vertigo, urinary or fecal incontinence, or confusion after the episode. Pt attributes his symptoms to marijuana use that he started recently after group home. Pt had recent episodes of diarrhea with post nasal drip but now his symptoms are improving. Pt denied any hx of fever, chills, nausea, vomiting, constipation, melena, pain in abdomen, sob, chest pain, cough, increased urinary frequency, dysuria, headache, any changes in weight, recent travel, lightheadedness, dizziness, vertigo, localized weakness or numbness/tingling.         Syncope - etiology probably due to Cannabis use    - No evidence of arrhythmia on telemetry disclosure    - No neurologic deficits    - CTSCAN Brain did not show any changes    - No further cardiac workup warrated- as per cardiologist    - for HTN and DLD continue home meds.

## 2020-01-06 NOTE — DISCHARGE NOTE NURSING/CASE MANAGEMENT/SOCIAL WORK - NSDCPEWEB_GEN_ALL_CORE
[facility] for Tobacco Control website --- [email or web address] website --- [email or web address]

## 2020-01-06 NOTE — DISCHARGE NOTE NURSING/CASE MANAGEMENT/SOCIAL WORK - NSDCPEEMAIL_GEN_ALL_CORE
[facility] for Tobacco Control email [email or web address]

## 2020-01-06 NOTE — CONSULT NOTE ADULT - SUBJECTIVE AND OBJECTIVE BOX
Patient was seen and examined by me on 3C.  Wife and daughter are at the bedside.  EMR reviewed.    Patient is a 64y old  Male who presents with a chief complaint of syncope (06 Jan 2020 09:11)      REASON FOR CONSULT: Syncope    HPI:    Mr. Reymundo Vila is an 64-year-old  male with a past medical history of Hypertension, Dyslipidemia, GERD and Hypothyroidism.    He presented at [facility] because of unresponsiveness.  As per wife, patient smoked marijuana at around 11 am on day of admission.  A few minutes after smoking marijuana, patient was noticed to be unresponsive.  Wife who is a registered nurse quickly applied CPR on patient...   Wife states that patient quickly recovered consciousness in less than a minute.   As per wife, patient experienced a similar episode sometime in September or October 2019 after he also smoked some marijuana.       He denies any chest pain or shortness of breath prior to the episode last Saturday, Jan 4, 2020.  He feels fine in bed.  He has ambulated in the unit without any chest pain, shortness of breath or lightheadedness.    He had an normal stress echo within the last 12 months.  _______________________________________________      House Staff Admission Notes:  65 y/o M with pmh of HTN, DLD, Dyslipidemia, GERD, hypothyroid presents with c/o Unresponsiveness for less than a minute. As per the wife who is a RN in this hospital, pt had some weed today, it was "strong stuff" and half an hour later the pt became unresponsive and diaphoretic. It lasted for less than a minute but then pt had another similar episode so the family called EMS and pt was brought to ED. Pt said that he kind of dozed off and he does not remember what happened but he did not have any symptoms of chest pain, palpitations, lightheadedness, vertigo, urinary or fecal incontinence, or confusion after the episode. Pt attributes his symptoms to marijuana use that he started recently after [facility]. Pt had recent episodes of diarrhea with post nasal drip but now his symptoms are improving. Pt denied any hx of fever, chills, nausea, vomiting, constipation, melena, pain in abdomen, sob, chest pain, cough, increased urinary frequency, dysuria, headache, any changes in weight, recent travel, lightheadedness, dizziness, vertigo, localized weakness or numbness/tingling. (05 Jan 2020 04:45)      PAST MEDICAL & SURGICAL HISTORY:  GERD (gastroesophageal reflux disease)  Hypothyroidism  High cholesterol  Hypertension  H/O vasectomy          SOCIAL HISTORY:     FAMILY HISTORY:    penicillin (Hives)      MEDICATIONS  (STANDING):  aspirin  chewable 81 milliGRAM(s) Oral daily  atorvastatin 40 milliGRAM(s) Oral at bedtime  enoxaparin Injectable 40 milliGRAM(s) SubCutaneous daily  hydrochlorothiazide 12.5 milliGRAM(s) Oral daily  latanoprost 0.005% Ophthalmic Solution 1 Drop(s) Both EYES at bedtime  levothyroxine 175 MICROGram(s) Oral daily  losartan 100 milliGRAM(s) Oral daily  olmesartan 40 mg/hydrochlorothiazide 12.5 mg 1 Tablet(s) Oral daily  pantoprazole    Tablet 40 milliGRAM(s) Oral before breakfast  timolol 0.5% Solution 1 Drop(s) Both EYES two times a day    MEDICATIONS  (PRN):      Vital Signs Last 24 Hrs  T(C): 35.6 (06 Jan 2020 05:53), Max: 37 (05 Jan 2020 16:06)  T(F): 96 (06 Jan 2020 05:53), Max: 98.6 (05 Jan 2020 16:06)  HR: 50 (06 Jan 2020 05:53) (50 - 74)  BP: 132/76 (06 Jan 2020 05:53) (132/76 - 159/63)  BP(mean): --  RR: 19 (06 Jan 2020 05:53) (19 - 20)  SpO2: 98% (05 Jan 2020 16:06) (98% - 98%) I&O's Detail    PHYSICAL EXAM:  Not in distress  Alert, oriented x 3  No JVD; regular rhythm; nl S1S2  No carotid bruit  Bilateral breath sounds   Abdomen soft, no guarding  No edema.  No focal lateralizing neurologic deficits.      REVIEW OF SYSTEMS  CONSTITUTIONAL:  No night sweats.  No fatigue, malaise, lethargy.  No fever or chills.  HEENT:  Eyes:  No visual changes.  No eye pain.  No eye discharge.  ENT:  No runny nose.  No epistaxis.   No sore throat.  No odynophagia.  No ear pain.  No congestion.  RESPIRATORY:  No cough.  No wheeze.  No hemoptysis.    CARDIOVASCULAR:  No chest pains.  No palpitations.   GASTROINTESTINAL:  No abdominal pain.  No nausea or vomiting.  No diarrhea or constipation.  No hematemesis.  No hematochezia.  No melena.  GENITOURINARY:  No urgency.  No frequency.  No dysuria.  No hematuria.  No obstructive symptoms.  NMUSCULOSKELETAL:  No musculoskeletal pain.  No joint swelling.  No arthritis.  NEUROLOGICAL:    No headache or neck pain.  No syncope or seizure.   SKIN:  No rashes.    ENDOCRINE:  No unexplained weight loss.    HEMATOLOGIC:   No purpura.    ALLERGIC AND IMMUNOLOGIC:  No pruritus.  No swelling.       ECG: Sinus Rhythm  Telemetry: Sinus Rhythm.  No malignant tachyarrhythmia, no pauses.  No AFIB or Afluter.    RADIOLOGY & ADDITIONAL STUDIES:  CTSCAN  < from: CT Head No Cont (01.05.20 @ 02:27) >  Findings:     The ventricles, basal cisterns and sulcal pattern are prominent consistent with parenchymal volume loss.    Patchy hypodensities are seen in the periventricular and subcortical white matter, nonspecific and without mass-effect, and may represent areas of microvascular change.    Grey-white differentiation is preserved.    There is no acute mass effect, midline shift or intracranial hemorrhage.      The calvarium is intact.    Right maxillary mucous retention cysts/polyps. Bilateral mastoid complexes are unremarkable.     Impression:     No CT evidence for acute intracranial pathology            < end of copied text >          LABS:                        12.0   4.66  )-----------( 170      ( 06 Jan 2020 06:44 )             35.0     01-06    141  |  103  |  22<H>  ----------------------------<  126<H>  4.4   |  25  |  1.3    Ca    9.0      06 Jan 2020 06:44  Mg     2.0     01-06    TPro  6.7  /  Alb  4.5  /  TBili  0.9  /  DBili  x   /  AST  17  /  ALT  13  /  AlkPhos  36  01-05    CARDIAC MARKERS ( 05 Jan 2020 17:25 )  x     / <0.01 ng/mL / x     / x     / x      CARDIAC MARKERS ( 05 Jan 2020 11:10 )  x     / <0.01 ng/mL / x     / x     / x      CARDIAC MARKERS ( 04 Jan 2020 21:44 )  x     / <0.01 ng/mL / x     / x     / x          PT/INR - ( 04 Jan 2020 21:44 )   PT: 11.40 sec;   INR: 0.99 ratio         PTT - ( 04 Jan 2020 21:44 )  PTT:26.8 sec  I&O's Summary

## 2020-01-06 NOTE — DISCHARGE NOTE PROVIDER - CARE PROVIDER_API CALL
Bin Steele)  Cardiovascular Disease; Nuclear Cardiology  [address], [address]  [place] [address]  Phone: [phone]  Fax: [phone]  Follow Up Time: 1 month    Kaushal hoover  [address] [place] [address]  Phone: [phone]  Fax: (   )    -  Follow Up Time: 1 week

## 2020-01-06 NOTE — DISCHARGE NOTE PROVIDER - CARE PROVIDERS DIRECT ADDRESSES
[email or web address]

## 2020-01-06 NOTE — DISCHARGE NOTE PROVIDER - PROVIDER TOKENS
PROVIDER:[TOKEN:[[id number]],FOLLOWUP:[1 month]],FREE:[LAST:[maryk],FIRST:[Kaushal],PHONE:[phone]],FAX:[(   )    -],ADDRESS:[[address] [place] [address]],FOLLOWUP:[1 week]]

## 2020-01-06 NOTE — PROGRESS NOTE ADULT - SUBJECTIVE AND OBJECTIVE BOX
Patient was seen and examined. Spoke with wife and RN. Chart reviewed.  No events overnight. Feeling much better  Vital Signs Last 24 Hrs  T(F): 96 (2020 05:53), Max: 98.6 (2020 16:06)  HR: 50 (2020 05:53) (50 - 74)  BP: 132/76 (2020 05:53) (132/76 - 159/63)  SpO2: 98% (2020 16:06) (98% - 98%)  MEDICATIONS  (STANDING):  aspirin  chewable 81 milliGRAM(s) Oral daily  atorvastatin 40 milliGRAM(s) Oral at bedtime  enoxaparin Injectable 40 milliGRAM(s) SubCutaneous daily  hydrochlorothiazide 12.5 milliGRAM(s) Oral daily  latanoprost 0.005% Ophthalmic Solution 1 Drop(s) Both EYES at bedtime  levothyroxine 175 MICROGram(s) Oral daily  losartan 100 milliGRAM(s) Oral daily  olmesartan 40 mg/hydrochlorothiazide 12.5 mg 1 Tablet(s) Oral daily  pantoprazole    Tablet 40 milliGRAM(s) Oral before breakfast  timolol 0.5% Solution 1 Drop(s) Both EYES two times a day    MEDICATIONS  (PRN):    Labs:                        12.0   4.66  )-----------( 170      ( 2020 06:44 )             35.0                         12.3   5.59  )-----------( 187      ( 2020 11:10 )             36.0     2020 06:44    141    |  103    |  22     ----------------------------<  126    4.4     |  25     |  1.3    2020 11:10    138    |  99     |  22     ----------------------------<  147    3.9     |  20     |  1.1      Ca    9.0        2020 06:44  Ca    9.0        2020 11:10  Mg     2.0       2020 06:44  Mg     1.5       2020 11:10    TPro  6.7    /  Alb  4.5    /  TBili  0.9    /  DBili  x      /  AST  17     /  ALT  13     /  AlkPhos  36     2020 11:10  TPro  7.1    /  Alb  4.6    /  TBili  0.7    /  DBili  x      /  AST  18     /  ALT  13     /  AlkPhos  36     2020 21:44    PT/INR - ( 2020 21:44 )   PT: 11.40 sec;   INR: 0.99 ratio         PTT - ( 2020 21:44 )  PTT:26.8 sec  Urinalysis Basic - ( 2020 06:00 )    Color: Light Yellow / Appearance: Clear / S.016 / pH: x  Gluc: x / Ketone: Negative  / Bili: Negative / Urobili: <2 mg/dL   Blood: x / Protein: Negative / Nitrite: Negative   Leuk Esterase: Negative / RBC: x / WBC x   Sq Epi: x / Non Sq Epi: x / Bacteria: x        General: comfortable, NAD  Neurology: A&Ox3, nonfocal  Head:  Normocephalic, atraumatic  ENT:  Mucosa moist, no ulcerations  Neck:  Supple, no JVD,   Skin: no breakdowns (as per RN)  Resp: CTA B/L  CV: RRR, S1S2,   GI: Soft, NT, bowel sounds  MS: No edema, + peripheral pulses, FROM all 4 extremity      A/P:  63 y/o M with pmh of HTN, DLD, Dyslipidemia, GERD, hypothyroid presents with c/o Unresponsiveness for less than a minute after using marijuana; also recently had URI and taking zithromax- now feeling much better    had recent stress echo    cardio eval Dr Steele    tele    pt aware of need to stop marijuana    DC home today if cleared by cardiology     DVT prophylaxis  Decubitus prevention- all measures as per RN protocol  Please call or text me with any questions or updates

## 2020-01-07 LAB
HCV AB S/CO SERPL IA: 1.01 S/CO — HIGH (ref 0–0.99)
HCV AB SERPL-IMP: ABNORMAL
HCV RNA FLD QL NAA+PROBE: SIGNIFICANT CHANGE UP
HCV RNA SPEC QL PROBE+SIG AMP: SIGNIFICANT CHANGE UP

## 2020-01-09 DIAGNOSIS — E03.9 HYPOTHYROIDISM, UNSPECIFIED: ICD-10-CM
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
## 2020-01-09 DIAGNOSIS — R55 SYNCOPE AND COLLAPSE: ICD-10-CM

## 2020-01-09 DIAGNOSIS — F12.188 CANNABIS ABUSE WITH OTHER CANNABIS-INDUCED DISORDER: ICD-10-CM

## 2020-01-09 DIAGNOSIS — E78.00 PURE HYPERCHOLESTEROLEMIA, UNSPECIFIED: ICD-10-CM

## 2020-01-09 DIAGNOSIS — T40.7X1A POISONING BY CANNABIS (DERIVATIVES), ACCIDENTAL (UNINTENTIONAL), INITIAL ENCOUNTER: ICD-10-CM

## 2020-01-09 DIAGNOSIS — I10 ESSENTIAL (PRIMARY) HYPERTENSION: ICD-10-CM

## 2020-01-09 DIAGNOSIS — H40.9 UNSPECIFIED GLAUCOMA: ICD-10-CM

## 2020-01-09 DIAGNOSIS — Z87.891 PERSONAL HISTORY OF NICOTINE DEPENDENCE: ICD-10-CM

## 2020-01-09 DIAGNOSIS — I45.10 UNSPECIFIED RIGHT BUNDLE-BRANCH BLOCK: ICD-10-CM

## 2020-01-09 DIAGNOSIS — Z88.0 ALLERGY STATUS TO PENICILLIN: ICD-10-CM

## 2020-01-09 DIAGNOSIS — K21.9 GASTRO-ESOPHAGEAL REFLUX DISEASE WITHOUT ESOPHAGITIS: ICD-10-CM

## 2020-05-04 NOTE — PATIENT PROFILE ADULT - NSPROIMPLANTSMEDDEV_GEN_A_NUR
0930   Time Out 0830  1000   Timed Code Minutes   30   Individual Treatment Minutes  30 30   Co-Treatment Minutes      Group Treatment Minutes      Concurrent Treatment Minutes        TOTAL DAILY MINUTES:  60     Electronically Signed by     Session 82 Sylwia GONZALES [id number] 5/4/2020 8:46 AM  Speech-Language Pathologist    Session 2  Ivon Jones M.A.  [facility] [id number]  Speech-Language Pathologist [phone]  5/4/2020 8:46 AM None

## 2020-05-27 NOTE — ED ADULT NURSE NOTE - NSFALLRSKOUTCOME_ED_ALL_ED
resolved on repeat testing GANESH mckoy planned for tomorrow  send infectious workup from fluid studies Universal Safety Interventions

## 2021-04-10 ENCOUNTER — EMERGENCY (EMERGENCY)
Facility: HOSPITAL | Age: 66
LOS: 0 days | Discharge: HOME | End: 2021-04-10
Attending: EMERGENCY MEDICINE | Admitting: EMERGENCY MEDICINE
Payer: COMMERCIAL

## 2021-04-10 VITALS
RESPIRATION RATE: 16 BRPM | HEART RATE: 112 BPM | OXYGEN SATURATION: 97 % | SYSTOLIC BLOOD PRESSURE: 139 MMHG | DIASTOLIC BLOOD PRESSURE: 70 MMHG | WEIGHT: 199.96 LBS | HEIGHT: 66 IN | TEMPERATURE: 100 F

## 2021-04-10 VITALS
SYSTOLIC BLOOD PRESSURE: 159 MMHG | OXYGEN SATURATION: 100 % | TEMPERATURE: 99 F | DIASTOLIC BLOOD PRESSURE: 85 MMHG | HEART RATE: 96 BPM

## 2021-04-10 DIAGNOSIS — E78.00 PURE HYPERCHOLESTEROLEMIA, UNSPECIFIED: ICD-10-CM

## 2021-04-10 DIAGNOSIS — E03.9 HYPOTHYROIDISM, UNSPECIFIED: ICD-10-CM

## 2021-04-10 DIAGNOSIS — R00.0 TACHYCARDIA, UNSPECIFIED: ICD-10-CM

## 2021-04-10 DIAGNOSIS — R55 SYNCOPE AND COLLAPSE: ICD-10-CM

## 2021-04-10 DIAGNOSIS — Z98.52 VASECTOMY STATUS: Chronic | ICD-10-CM

## 2021-04-10 DIAGNOSIS — Z88.0 ALLERGY STATUS TO PENICILLIN: ICD-10-CM

## 2021-04-10 DIAGNOSIS — Z20.822 CONTACT WITH AND (SUSPECTED) EXPOSURE TO COVID-19: ICD-10-CM

## 2021-04-10 DIAGNOSIS — I10 ESSENTIAL (PRIMARY) HYPERTENSION: ICD-10-CM

## 2021-04-10 DIAGNOSIS — E86.0 DEHYDRATION: ICD-10-CM

## 2021-04-10 DIAGNOSIS — R50.9 FEVER, UNSPECIFIED: ICD-10-CM

## 2021-04-10 DIAGNOSIS — F17.200 NICOTINE DEPENDENCE, UNSPECIFIED, UNCOMPLICATED: ICD-10-CM

## 2021-04-10 DIAGNOSIS — K21.9 GASTRO-ESOPHAGEAL REFLUX DISEASE WITHOUT ESOPHAGITIS: ICD-10-CM

## 2021-04-10 DIAGNOSIS — Z79.899 OTHER LONG TERM (CURRENT) DRUG THERAPY: ICD-10-CM

## 2021-04-10 LAB
ALBUMIN SERPL ELPH-MCNC: 4.4 G/DL — SIGNIFICANT CHANGE UP (ref 3.5–5.2)
ALP SERPL-CCNC: 35 U/L — SIGNIFICANT CHANGE UP (ref 30–115)
ALT FLD-CCNC: 23 U/L — SIGNIFICANT CHANGE UP (ref 0–41)
ANION GAP SERPL CALC-SCNC: 14 MMOL/L — SIGNIFICANT CHANGE UP (ref 7–14)
APPEARANCE UR: CLEAR — SIGNIFICANT CHANGE UP
AST SERPL-CCNC: 22 U/L — SIGNIFICANT CHANGE UP (ref 0–41)
BASOPHILS # BLD AUTO: 0.03 K/UL — SIGNIFICANT CHANGE UP (ref 0–0.2)
BASOPHILS NFR BLD AUTO: 0.3 % — SIGNIFICANT CHANGE UP (ref 0–1)
BILIRUB SERPL-MCNC: 0.6 MG/DL — SIGNIFICANT CHANGE UP (ref 0.2–1.2)
BILIRUB UR-MCNC: NEGATIVE — SIGNIFICANT CHANGE UP
BUN SERPL-MCNC: 19 MG/DL — SIGNIFICANT CHANGE UP (ref 10–20)
CALCIUM SERPL-MCNC: 9.2 MG/DL — SIGNIFICANT CHANGE UP (ref 8.5–10.1)
CHLORIDE SERPL-SCNC: 96 MMOL/L — LOW (ref 98–110)
CO2 SERPL-SCNC: 21 MMOL/L — SIGNIFICANT CHANGE UP (ref 17–32)
COLOR SPEC: YELLOW — SIGNIFICANT CHANGE UP
CREAT SERPL-MCNC: 1.2 MG/DL — SIGNIFICANT CHANGE UP (ref 0.7–1.5)
DIFF PNL FLD: NEGATIVE — SIGNIFICANT CHANGE UP
EOSINOPHIL # BLD AUTO: 0.03 K/UL — SIGNIFICANT CHANGE UP (ref 0–0.7)
EOSINOPHIL NFR BLD AUTO: 0.3 % — SIGNIFICANT CHANGE UP (ref 0–8)
GLUCOSE SERPL-MCNC: 107 MG/DL — HIGH (ref 70–99)
GLUCOSE UR QL: NEGATIVE — SIGNIFICANT CHANGE UP
HCT VFR BLD CALC: 36.8 % — LOW (ref 42–52)
HGB BLD-MCNC: 13.3 G/DL — LOW (ref 14–18)
IMM GRANULOCYTES NFR BLD AUTO: 0.7 % — HIGH (ref 0.1–0.3)
KETONES UR-MCNC: ABNORMAL
LACTATE SERPL-SCNC: 2 MMOL/L — SIGNIFICANT CHANGE UP (ref 0.7–2)
LEUKOCYTE ESTERASE UR-ACNC: NEGATIVE — SIGNIFICANT CHANGE UP
LYMPHOCYTES # BLD AUTO: 0.52 K/UL — LOW (ref 1.2–3.4)
LYMPHOCYTES # BLD AUTO: 4.5 % — LOW (ref 20.5–51.1)
MCHC RBC-ENTMCNC: 31.5 PG — HIGH (ref 27–31)
MCHC RBC-ENTMCNC: 36.1 G/DL — SIGNIFICANT CHANGE UP (ref 32–37)
MCV RBC AUTO: 87.2 FL — SIGNIFICANT CHANGE UP (ref 80–94)
MONOCYTES # BLD AUTO: 0.69 K/UL — HIGH (ref 0.1–0.6)
MONOCYTES NFR BLD AUTO: 6 % — SIGNIFICANT CHANGE UP (ref 1.7–9.3)
NEUTROPHILS # BLD AUTO: 10.11 K/UL — HIGH (ref 1.4–6.5)
NEUTROPHILS NFR BLD AUTO: 88.2 % — HIGH (ref 42.2–75.2)
NITRITE UR-MCNC: NEGATIVE — SIGNIFICANT CHANGE UP
NRBC # BLD: 0 /100 WBCS — SIGNIFICANT CHANGE UP (ref 0–0)
PH UR: 5.5 — SIGNIFICANT CHANGE UP (ref 5–8)
PLATELET # BLD AUTO: 192 K/UL — SIGNIFICANT CHANGE UP (ref 130–400)
POTASSIUM SERPL-MCNC: 4.3 MMOL/L — SIGNIFICANT CHANGE UP (ref 3.5–5)
POTASSIUM SERPL-SCNC: 4.3 MMOL/L — SIGNIFICANT CHANGE UP (ref 3.5–5)
PROT SERPL-MCNC: 7 G/DL — SIGNIFICANT CHANGE UP (ref 6–8)
PROT UR-MCNC: SIGNIFICANT CHANGE UP
RBC # BLD: 4.22 M/UL — LOW (ref 4.7–6.1)
RBC # FLD: 11.6 % — SIGNIFICANT CHANGE UP (ref 11.5–14.5)
SARS-COV-2 RNA SPEC QL NAA+PROBE: SIGNIFICANT CHANGE UP
SODIUM SERPL-SCNC: 131 MMOL/L — LOW (ref 135–146)
SP GR SPEC: 1.02 — SIGNIFICANT CHANGE UP (ref 1.01–1.03)
TROPONIN T SERPL-MCNC: <0.01 NG/ML — SIGNIFICANT CHANGE UP
UROBILINOGEN FLD QL: SIGNIFICANT CHANGE UP
WBC # BLD: 11.46 K/UL — HIGH (ref 4.8–10.8)
WBC # FLD AUTO: 11.46 K/UL — HIGH (ref 4.8–10.8)

## 2021-04-10 PROCEDURE — 71045 X-RAY EXAM CHEST 1 VIEW: CPT | Mod: 26

## 2021-04-10 PROCEDURE — 93010 ELECTROCARDIOGRAM REPORT: CPT

## 2021-04-10 PROCEDURE — 70450 CT HEAD/BRAIN W/O DYE: CPT | Mod: 26

## 2021-04-10 PROCEDURE — 99285 EMERGENCY DEPT VISIT HI MDM: CPT

## 2021-04-10 RX ORDER — SODIUM CHLORIDE 9 MG/ML
500 INJECTION INTRAMUSCULAR; INTRAVENOUS; SUBCUTANEOUS ONCE
Refills: 0 | Status: COMPLETED | OUTPATIENT
Start: 2021-04-10 | End: 2021-04-10

## 2021-04-10 RX ORDER — SODIUM CHLORIDE 9 MG/ML
1000 INJECTION INTRAMUSCULAR; INTRAVENOUS; SUBCUTANEOUS ONCE
Refills: 0 | Status: COMPLETED | OUTPATIENT
Start: 2021-04-10 | End: 2021-04-10

## 2021-04-10 RX ORDER — ACETAMINOPHEN 500 MG
650 TABLET ORAL ONCE
Refills: 0 | Status: COMPLETED | OUTPATIENT
Start: 2021-04-10 | End: 2021-04-10

## 2021-04-10 RX ADMIN — Medication 650 MILLIGRAM(S): at 12:29

## 2021-04-10 RX ADMIN — SODIUM CHLORIDE 1000 MILLILITER(S): 9 INJECTION INTRAMUSCULAR; INTRAVENOUS; SUBCUTANEOUS at 10:14

## 2021-04-10 RX ADMIN — SODIUM CHLORIDE 500 MILLILITER(S): 9 INJECTION INTRAMUSCULAR; INTRAVENOUS; SUBCUTANEOUS at 11:39

## 2021-04-10 NOTE — ED PROVIDER NOTE - OBJECTIVE STATEMENT
66 y male with PMH of HTN HLD hypothyroidism GERD presents s/p fall from standing after getting up from the toilet 1 hour PTA.  +HT hit back of head on way down.  patient admitted in jan for syncopal episode and found to have PVC's on EKG.  Triage EKG today shows sinus tach to 115 with PVC.  patient also reports recent fevers for the past 2 days Tmax 100.3.  Denies CP, SOB, Dizziness, leg swelling, N/V abd pain.

## 2021-04-10 NOTE — ED ADULT NURSE NOTE - INTERVENTIONS DEFINITIONS
[place] to call system/Call bell, personal items and telephone within reach/Instruct patient to call for assistance/Non-slip footwear when patient is off stretcher/Physically safe environment: no spills, clutter or unnecessary equipment/Stretcher in lowest position, wheels locked, appropriate side rails in place/Monitor gait and stability

## 2021-04-10 NOTE — ED ADULT TRIAGE NOTE - CHIEF COMPLAINT QUOTE
"I was walking, got sweaty, and felt weak and fell. I hit my head." Pt denies anticoagulation/antiplatelet medication.

## 2021-04-10 NOTE — ED PROVIDER NOTE - PATIENT PORTAL LINK FT
You can access the FollowMyHealth Patient Portal offered by [facility] by registering at the following website: [email or web address]. By joining [employer]’s FollowMyHealth portal, you will also be able to view your health information using other applications (apps) compatible with our system.

## 2021-04-10 NOTE — ED PROVIDER NOTE - PHYSICAL EXAMINATION
PHYSICAL EXAM: I have reviewed current vital signs.  GENERAL: NAD, well-nourished; well-developed.  HEAD:  Normocephalic, atraumatic.  EYES: EOMI, PERRL, conjunctiva and sclera clear.  ENT: MMM, no erythema/exudates.  NECK: Supple, no JVD.  CHEST/LUNG: Clear to auscultation bilaterally; no wheezes, rales, or rhonchi.  HEART: tachycardic Regular rhythm, normal S1 and S2; no murmurs, rubs, or gallops.  ABDOMEN: Soft, nontender, nondistended.  EXTREMITIES:  2+ peripheral pulses; no clubbing, cyanosis, or edema.  PSYCH: Cooperative, appropriate, normal mood and affect.  NEUROLOGY: A&O x 3. Motor 5/5. Sensory intact. No focal neurological deficits. CN II - XII intact. (-) dysmetria, facial droop, pronator drift.  SKIN: Warm and dry.

## 2021-04-10 NOTE — ED ADULT NURSE NOTE - OBJECTIVE STATEMENT
Pt presented s/p near syncopal episode/fall. Pt states he was having a BM when he stood up to pull up his pants and felt his legs go weak and fell. Pt states he did not black out. Denies hitting head/anticoagulation. Pt endorses remembering the entire event. Pt's wife was at his side immediately after fall and states he felt his pulse tachycardic with multiple PVCs. Pt denies cp/sob/n/v/fever/diarrhea. Alert and oriented x3.

## 2021-04-10 NOTE — ED PROVIDER NOTE - CARE PLAN
Principal Discharge DX:	Vasovagal syncope  Secondary Diagnosis:	Tachycardia  Secondary Diagnosis:	Fever  Secondary Diagnosis:	Dehydration

## 2021-04-10 NOTE — ED PROVIDER NOTE - CLINICAL SUMMARY MEDICAL DECISION MAKING FREE TEXT BOX
Patient presents after a fall. Was straining in the bathroom followed by a syncopal fall. Found to be tachyardic and hypotensive, both improved on arrival. labs, ekg, cxr, ct done. Found to have signs of dehydration, improved with fluids. Discussed with patient's cardiologist Dr. Steele who states that he is ok with discharge and follow up in the office on monday. Patient and his wife who is a nurse are agreeable with plan. Return precautions discussed. Understand to follow up with pmd and cardiology.

## 2021-04-10 NOTE — ED PROVIDER NOTE - NSFOLLOWUPINSTRUCTIONS_ED_ALL_ED_FT
You were noted to have what is called, a syncopal episode, which is an event when you temporarily lose consciousness. These events happen for a variety of reasons and you were kept in the hospital to evaluate what the cause of your event was. Thankfully, these events in themselves do not leave any long lasting effects on your body, however, they may happen again if the cause of the problem is not found and treated if need be. Many people never find out what caused their event. If you have been advised to follow up with any doctors, or specialists, please be sure to do so.

## 2021-04-10 NOTE — ED PROVIDER NOTE - NS ED ROS FT
Constitutional:  (+) fevers  no chills.  Eyes:  No visual changes, eye pain, or discharge.  ENT:  No hearing changes. No sore throat.  Neck:  No neck pain or stiffness.  Cardiac:  No CP or edema.  Resp:  No cough or SOB. No hemoptysis.   GI:  No nausea, vomiting, diarrhea, or abdominal pain.  :  No dysuria, frequency, or hematuria.  MSK:  No myalgias or joint pain/swelling.  Neuro:  (+) weakness No headache, dizziness.  Skin:  No skin rash.

## 2021-04-10 NOTE — ED PROVIDER NOTE - CARE PROVIDER_API CALL
Bin Steele  CARDIOVASCULAR DISEASE  [address] [facility] [address]  [place] [address]  Phone: [phone]  Fax: [phone]  Follow Up Time: 1-3 Days

## 2021-04-10 NOTE — ED PROVIDER NOTE - ATTENDING CONTRIBUTION TO CARE
67 yo M pmh of HTN, HLD, hypothyroid presents after a syncopal episode. Patient was in the bathroom having a bowel movement started to feel lightheaded and passed out. + head trauma. Patient's wife is a nurse. States that he woke up right away. Was found to be tachycardic and pressure was in the 80s. Patient denies any chest pain, no shortness of breath, no palpitations. no n/v/d, no abdominal pain. Does report feeling warm. no chills. Febrile to 100 in the ED. no cough or recent illnes. Fully vaccinated for covid. Cardiologist is Dr. Deras, had a normal stress and echo in 2019.     CONSTITUTIONAL: Well-developed; well-nourished; in no acute distress.   SKIN: warm, dry  HEAD: Normocephalic; atraumatic.  EYES: PERRL, EOMI, no conjunctival erythema  ENT: No nasal discharge; airway clear.  NECK: Supple; non tender.  CARD: S1, S2 normal;  Regular rate and rhythm.   RESP: No wheezes, rales or rhonchi.  ABD: soft non tender, non distended, no rebound or guarding  EXT: Normal ROM.  5/5 strength in all 4 extremities. no pedal edema, no calf tenderness.   LYMPH: No acute cervical adenopathy.  NEURO: Alert, oriented, grossly unremarkable. neurovascularly intact  PSYCH: Cooperative, appropriate.

## 2021-04-10 NOTE — ED PROVIDER NOTE - PROGRESS NOTE DETAILS
Discussed with Dr. Steele who is aware of dizzy episode. States that if work up negative, likely secondary to vasovagal episode. states that patient can be discharged with close follow up with im in the office on monday. Kondrat- HR 96 and stable.  100% sat /85 and temp 99.3 patient ready for dispo.

## 2021-04-11 LAB
CULTURE RESULTS: SIGNIFICANT CHANGE UP
SPECIMEN SOURCE: SIGNIFICANT CHANGE UP

## 2021-04-15 LAB
CULTURE RESULTS: SIGNIFICANT CHANGE UP
CULTURE RESULTS: SIGNIFICANT CHANGE UP
SPECIMEN SOURCE: SIGNIFICANT CHANGE UP
SPECIMEN SOURCE: SIGNIFICANT CHANGE UP

## 2021-08-25 ENCOUNTER — INPATIENT (INPATIENT)
Facility: HOSPITAL | Age: 66
LOS: 3 days | Discharge: HOME | End: 2021-08-29
Attending: INTERNAL MEDICINE | Admitting: INTERNAL MEDICINE
Payer: COMMERCIAL

## 2021-08-25 VITALS
HEART RATE: 94 BPM | WEIGHT: 194.01 LBS | OXYGEN SATURATION: 95 % | SYSTOLIC BLOOD PRESSURE: 122 MMHG | RESPIRATION RATE: 18 BRPM | TEMPERATURE: 99 F | DIASTOLIC BLOOD PRESSURE: 73 MMHG | HEIGHT: 66 IN

## 2021-08-25 DIAGNOSIS — Z98.52 VASECTOMY STATUS: Chronic | ICD-10-CM

## 2021-08-25 LAB
APTT BLD: 30.1 SEC — SIGNIFICANT CHANGE UP (ref 27–39.2)
BASOPHILS # BLD AUTO: 0.03 K/UL — SIGNIFICANT CHANGE UP (ref 0–0.2)
BASOPHILS NFR BLD AUTO: 0.5 % — SIGNIFICANT CHANGE UP (ref 0–1)
EOSINOPHIL # BLD AUTO: 0.01 K/UL — SIGNIFICANT CHANGE UP (ref 0–0.7)
EOSINOPHIL NFR BLD AUTO: 0.2 % — SIGNIFICANT CHANGE UP (ref 0–8)
HCT VFR BLD CALC: 35.6 % — LOW (ref 42–52)
HGB BLD-MCNC: 12.7 G/DL — LOW (ref 14–18)
IMM GRANULOCYTES NFR BLD AUTO: 0.2 % — SIGNIFICANT CHANGE UP (ref 0.1–0.3)
INR BLD: 1.02 RATIO — SIGNIFICANT CHANGE UP (ref 0.65–1.3)
LYMPHOCYTES # BLD AUTO: 0.66 K/UL — LOW (ref 1.2–3.4)
LYMPHOCYTES # BLD AUTO: 11.9 % — LOW (ref 20.5–51.1)
MCHC RBC-ENTMCNC: 30.7 PG — SIGNIFICANT CHANGE UP (ref 27–31)
MCHC RBC-ENTMCNC: 35.7 G/DL — SIGNIFICANT CHANGE UP (ref 32–37)
MCV RBC AUTO: 86 FL — SIGNIFICANT CHANGE UP (ref 80–94)
MONOCYTES # BLD AUTO: 0.44 K/UL — SIGNIFICANT CHANGE UP (ref 0.1–0.6)
MONOCYTES NFR BLD AUTO: 7.9 % — SIGNIFICANT CHANGE UP (ref 1.7–9.3)
NEUTROPHILS # BLD AUTO: 4.39 K/UL — SIGNIFICANT CHANGE UP (ref 1.4–6.5)
NEUTROPHILS NFR BLD AUTO: 79.3 % — HIGH (ref 42.2–75.2)
NRBC # BLD: 0 /100 WBCS — SIGNIFICANT CHANGE UP (ref 0–0)
PLATELET # BLD AUTO: 151 K/UL — SIGNIFICANT CHANGE UP (ref 130–400)
PROTHROM AB SERPL-ACNC: 11.7 SEC — SIGNIFICANT CHANGE UP (ref 9.95–12.87)
RBC # BLD: 4.14 M/UL — LOW (ref 4.7–6.1)
RBC # FLD: 12.2 % — SIGNIFICANT CHANGE UP (ref 11.5–14.5)
WBC # BLD: 5.54 K/UL — SIGNIFICANT CHANGE UP (ref 4.8–10.8)
WBC # FLD AUTO: 5.54 K/UL — SIGNIFICANT CHANGE UP (ref 4.8–10.8)

## 2021-08-25 PROCEDURE — 93010 ELECTROCARDIOGRAM REPORT: CPT

## 2021-08-25 PROCEDURE — 99285 EMERGENCY DEPT VISIT HI MDM: CPT

## 2021-08-25 PROCEDURE — 70450 CT HEAD/BRAIN W/O DYE: CPT | Mod: 26,MA

## 2021-08-25 RX ORDER — MAGNESIUM SULFATE 500 MG/ML
2 VIAL (ML) INJECTION ONCE
Refills: 0 | Status: COMPLETED | OUTPATIENT
Start: 2021-08-25 | End: 2021-08-25

## 2021-08-25 RX ORDER — SODIUM CHLORIDE 9 MG/ML
1000 INJECTION INTRAMUSCULAR; INTRAVENOUS; SUBCUTANEOUS ONCE
Refills: 0 | Status: COMPLETED | OUTPATIENT
Start: 2021-08-25 | End: 2021-08-25

## 2021-08-25 NOTE — ED ADULT NURSE NOTE - OBJECTIVE STATEMENT
pt states that he had syncopal episode today lasting only a few seconds, states he felt lightheaded, diaphoretic, and then passed out, witnessed by wife, denies fever, n/v, cough, pain, states he has been having diarrhea x2 days

## 2021-08-25 NOTE — ED ADULT TRIAGE NOTE - CHIEF COMPLAINT QUOTE
Pt was at home when he passed out as per EMS, wife heard him from the next room. Pt denies head injury. Pt had a previous history of syncope related anxiety. Pt has seen a cardio for this in the past.

## 2021-08-26 LAB
ALBUMIN SERPL ELPH-MCNC: 4.2 G/DL — SIGNIFICANT CHANGE UP (ref 3.5–5.2)
ALBUMIN SERPL ELPH-MCNC: 4.4 G/DL — SIGNIFICANT CHANGE UP (ref 3.5–5.2)
ALP SERPL-CCNC: 51 U/L — SIGNIFICANT CHANGE UP (ref 30–115)
ALP SERPL-CCNC: 53 U/L — SIGNIFICANT CHANGE UP (ref 30–115)
ALT FLD-CCNC: 18 U/L — SIGNIFICANT CHANGE UP (ref 0–41)
ALT FLD-CCNC: 19 U/L — SIGNIFICANT CHANGE UP (ref 0–41)
ANION GAP SERPL CALC-SCNC: 14 MMOL/L — SIGNIFICANT CHANGE UP (ref 7–14)
ANION GAP SERPL CALC-SCNC: 19 MMOL/L — HIGH (ref 7–14)
APPEARANCE UR: CLEAR — SIGNIFICANT CHANGE UP
AST SERPL-CCNC: 34 U/L — SIGNIFICANT CHANGE UP (ref 0–41)
AST SERPL-CCNC: 36 U/L — SIGNIFICANT CHANGE UP (ref 0–41)
BASOPHILS # BLD AUTO: 0.02 K/UL — SIGNIFICANT CHANGE UP (ref 0–0.2)
BASOPHILS NFR BLD AUTO: 0.5 % — SIGNIFICANT CHANGE UP (ref 0–1)
BILIRUB SERPL-MCNC: 0.9 MG/DL — SIGNIFICANT CHANGE UP (ref 0.2–1.2)
BILIRUB SERPL-MCNC: 0.9 MG/DL — SIGNIFICANT CHANGE UP (ref 0.2–1.2)
BILIRUB UR-MCNC: NEGATIVE — SIGNIFICANT CHANGE UP
BUN SERPL-MCNC: 15 MG/DL — SIGNIFICANT CHANGE UP (ref 10–20)
BUN SERPL-MCNC: 16 MG/DL — SIGNIFICANT CHANGE UP (ref 10–20)
CALCIUM SERPL-MCNC: 8.7 MG/DL — SIGNIFICANT CHANGE UP (ref 8.5–10.1)
CALCIUM SERPL-MCNC: 9 MG/DL — SIGNIFICANT CHANGE UP (ref 8.5–10.1)
CHLORIDE SERPL-SCNC: 91 MMOL/L — LOW (ref 98–110)
CHLORIDE SERPL-SCNC: 93 MMOL/L — LOW (ref 98–110)
CO2 SERPL-SCNC: 20 MMOL/L — SIGNIFICANT CHANGE UP (ref 17–32)
CO2 SERPL-SCNC: 23 MMOL/L — SIGNIFICANT CHANGE UP (ref 17–32)
COLOR SPEC: SIGNIFICANT CHANGE UP
CREAT SERPL-MCNC: 1.1 MG/DL — SIGNIFICANT CHANGE UP (ref 0.7–1.5)
CREAT SERPL-MCNC: 1.3 MG/DL — SIGNIFICANT CHANGE UP (ref 0.7–1.5)
DIFF PNL FLD: SIGNIFICANT CHANGE UP
EOSINOPHIL # BLD AUTO: 0.01 K/UL — SIGNIFICANT CHANGE UP (ref 0–0.7)
EOSINOPHIL NFR BLD AUTO: 0.2 % — SIGNIFICANT CHANGE UP (ref 0–8)
GLUCOSE SERPL-MCNC: 100 MG/DL — HIGH (ref 70–99)
GLUCOSE SERPL-MCNC: 101 MG/DL — HIGH (ref 70–99)
GLUCOSE UR QL: NEGATIVE — SIGNIFICANT CHANGE UP
HCT VFR BLD CALC: 35.1 % — LOW (ref 42–52)
HGB BLD-MCNC: 12.4 G/DL — LOW (ref 14–18)
IMM GRANULOCYTES NFR BLD AUTO: 0.2 % — SIGNIFICANT CHANGE UP (ref 0.1–0.3)
KETONES UR-MCNC: SIGNIFICANT CHANGE UP
LEUKOCYTE ESTERASE UR-ACNC: NEGATIVE — SIGNIFICANT CHANGE UP
LYMPHOCYTES # BLD AUTO: 0.48 K/UL — LOW (ref 1.2–3.4)
LYMPHOCYTES # BLD AUTO: 11.4 % — LOW (ref 20.5–51.1)
MAGNESIUM SERPL-MCNC: 1.1 MG/DL — LOW (ref 1.8–2.4)
MAGNESIUM SERPL-MCNC: 1.4 MG/DL — LOW (ref 1.8–2.4)
MCHC RBC-ENTMCNC: 31.3 PG — HIGH (ref 27–31)
MCHC RBC-ENTMCNC: 35.3 G/DL — SIGNIFICANT CHANGE UP (ref 32–37)
MCV RBC AUTO: 88.6 FL — SIGNIFICANT CHANGE UP (ref 80–94)
MONOCYTES # BLD AUTO: 0.43 K/UL — SIGNIFICANT CHANGE UP (ref 0.1–0.6)
MONOCYTES NFR BLD AUTO: 10.2 % — HIGH (ref 1.7–9.3)
NEUTROPHILS # BLD AUTO: 3.26 K/UL — SIGNIFICANT CHANGE UP (ref 1.4–6.5)
NEUTROPHILS NFR BLD AUTO: 77.5 % — HIGH (ref 42.2–75.2)
NITRITE UR-MCNC: NEGATIVE — SIGNIFICANT CHANGE UP
NRBC # BLD: 0 /100 WBCS — SIGNIFICANT CHANGE UP (ref 0–0)
OSMOLALITY SERPL: 270 MOS/KG — LOW (ref 280–301)
PH UR: 6 — SIGNIFICANT CHANGE UP (ref 5–8)
PLATELET # BLD AUTO: 129 K/UL — LOW (ref 130–400)
POTASSIUM SERPL-MCNC: 3.4 MMOL/L — LOW (ref 3.5–5)
POTASSIUM SERPL-MCNC: 4.2 MMOL/L — SIGNIFICANT CHANGE UP (ref 3.5–5)
POTASSIUM SERPL-SCNC: 3.4 MMOL/L — LOW (ref 3.5–5)
POTASSIUM SERPL-SCNC: 4.2 MMOL/L — SIGNIFICANT CHANGE UP (ref 3.5–5)
PROT SERPL-MCNC: 6.4 G/DL — SIGNIFICANT CHANGE UP (ref 6–8)
PROT SERPL-MCNC: 6.6 G/DL — SIGNIFICANT CHANGE UP (ref 6–8)
PROT UR-MCNC: SIGNIFICANT CHANGE UP
RBC # BLD: 3.96 M/UL — LOW (ref 4.7–6.1)
RBC # FLD: 12.3 % — SIGNIFICANT CHANGE UP (ref 11.5–14.5)
SARS-COV-2 RNA SPEC QL NAA+PROBE: SIGNIFICANT CHANGE UP
SODIUM SERPL-SCNC: 130 MMOL/L — LOW (ref 135–146)
SODIUM SERPL-SCNC: 130 MMOL/L — LOW (ref 135–146)
SP GR SPEC: 1.02 — SIGNIFICANT CHANGE UP (ref 1.01–1.03)
TROPONIN T SERPL-MCNC: <0.01 NG/ML — SIGNIFICANT CHANGE UP
TSH SERPL-MCNC: 0.54 UIU/ML — SIGNIFICANT CHANGE UP (ref 0.27–4.2)
UROBILINOGEN FLD QL: SIGNIFICANT CHANGE UP
WBC # BLD: 4.21 K/UL — LOW (ref 4.8–10.8)
WBC # FLD AUTO: 4.21 K/UL — LOW (ref 4.8–10.8)

## 2021-08-26 PROCEDURE — 71045 X-RAY EXAM CHEST 1 VIEW: CPT | Mod: 26

## 2021-08-26 PROCEDURE — 99221 1ST HOSP IP/OBS SF/LOW 40: CPT

## 2021-08-26 PROCEDURE — 93880 EXTRACRANIAL BILAT STUDY: CPT | Mod: 26

## 2021-08-26 PROCEDURE — 93010 ELECTROCARDIOGRAM REPORT: CPT

## 2021-08-26 RX ORDER — LOSARTAN POTASSIUM 100 MG/1
100 TABLET, FILM COATED ORAL DAILY
Refills: 0 | Status: DISCONTINUED | OUTPATIENT
Start: 2021-08-26 | End: 2021-08-26

## 2021-08-26 RX ORDER — OMEGA-3 ACID ETHYL ESTERS 1 G
4 CAPSULE ORAL DAILY
Refills: 0 | Status: DISCONTINUED | OUTPATIENT
Start: 2021-08-26 | End: 2021-08-29

## 2021-08-26 RX ORDER — FOLIC ACID 0.8 MG
1 TABLET ORAL DAILY
Refills: 0 | Status: DISCONTINUED | OUTPATIENT
Start: 2021-08-26 | End: 2021-08-29

## 2021-08-26 RX ORDER — ASPIRIN/CALCIUM CARB/MAGNESIUM 324 MG
1 TABLET ORAL
Qty: 0 | Refills: 0 | DISCHARGE

## 2021-08-26 RX ORDER — ENOXAPARIN SODIUM 100 MG/ML
40 INJECTION SUBCUTANEOUS DAILY
Refills: 0 | Status: DISCONTINUED | OUTPATIENT
Start: 2021-08-26 | End: 2021-08-29

## 2021-08-26 RX ORDER — HYDROCHLOROTHIAZIDE 25 MG
12.5 TABLET ORAL DAILY
Refills: 0 | Status: DISCONTINUED | OUTPATIENT
Start: 2021-08-26 | End: 2021-08-29

## 2021-08-26 RX ORDER — TIMOLOL 0.5 %
1 DROPS OPHTHALMIC (EYE) DAILY
Refills: 0 | Status: DISCONTINUED | OUTPATIENT
Start: 2021-08-26 | End: 2021-08-29

## 2021-08-26 RX ORDER — OMEPRAZOLE 10 MG/1
1 CAPSULE, DELAYED RELEASE ORAL
Qty: 0 | Refills: 0 | DISCHARGE

## 2021-08-26 RX ORDER — OLMESARTAN MEDOXOMIL-HYDROCHLOROTHIAZIDE 25; 40 MG/1; MG/1
1 TABLET, FILM COATED ORAL DAILY
Refills: 0 | Status: DISCONTINUED | OUTPATIENT
Start: 2021-08-27 | End: 2021-08-27

## 2021-08-26 RX ORDER — MAGNESIUM SULFATE 500 MG/ML
2 VIAL (ML) INJECTION ONCE
Refills: 0 | Status: COMPLETED | OUTPATIENT
Start: 2021-08-26 | End: 2021-08-26

## 2021-08-26 RX ORDER — POTASSIUM CHLORIDE 20 MEQ
40 PACKET (EA) ORAL ONCE
Refills: 0 | Status: COMPLETED | OUTPATIENT
Start: 2021-08-26 | End: 2021-08-26

## 2021-08-26 RX ORDER — FOLIC ACID 0.8 MG
1 TABLET ORAL
Qty: 0 | Refills: 0 | DISCHARGE

## 2021-08-26 RX ORDER — MAGNESIUM SULFATE 500 MG/ML
2 VIAL (ML) INJECTION ONCE
Refills: 0 | Status: DISCONTINUED | OUTPATIENT
Start: 2021-08-26 | End: 2021-08-26

## 2021-08-26 RX ORDER — OMEGA-3 ACID ETHYL ESTERS 1 G
4000 CAPSULE ORAL
Qty: 0 | Refills: 0 | DISCHARGE

## 2021-08-26 RX ORDER — ACETAMINOPHEN 500 MG
650 TABLET ORAL EVERY 6 HOURS
Refills: 0 | Status: DISCONTINUED | OUTPATIENT
Start: 2021-08-26 | End: 2021-08-29

## 2021-08-26 RX ORDER — LEVOTHYROXINE SODIUM 125 MCG
175 TABLET ORAL DAILY
Refills: 0 | Status: DISCONTINUED | OUTPATIENT
Start: 2021-08-26 | End: 2021-08-29

## 2021-08-26 RX ORDER — LATANOPROST 0.05 MG/ML
1 SOLUTION/ DROPS OPHTHALMIC; TOPICAL AT BEDTIME
Refills: 0 | Status: DISCONTINUED | OUTPATIENT
Start: 2021-08-26 | End: 2021-08-29

## 2021-08-26 RX ORDER — ASPIRIN/CALCIUM CARB/MAGNESIUM 324 MG
81 TABLET ORAL DAILY
Refills: 0 | Status: DISCONTINUED | OUTPATIENT
Start: 2021-08-26 | End: 2021-08-29

## 2021-08-26 RX ORDER — ERGOCALCIFEROL 1.25 MG/1
1 CAPSULE ORAL
Qty: 0 | Refills: 0 | DISCHARGE

## 2021-08-26 RX ORDER — ATORVASTATIN CALCIUM 80 MG/1
40 TABLET, FILM COATED ORAL DAILY
Refills: 0 | Status: DISCONTINUED | OUTPATIENT
Start: 2021-08-26 | End: 2021-08-29

## 2021-08-26 RX ORDER — POTASSIUM CHLORIDE 20 MEQ
20 PACKET (EA) ORAL ONCE
Refills: 0 | Status: DISCONTINUED | OUTPATIENT
Start: 2021-08-26 | End: 2021-08-26

## 2021-08-26 RX ADMIN — Medication 25 GRAM(S): at 12:44

## 2021-08-26 RX ADMIN — Medication 175 MICROGRAM(S): at 06:05

## 2021-08-26 RX ADMIN — LATANOPROST 1 DROP(S): 0.05 SOLUTION/ DROPS OPHTHALMIC; TOPICAL at 21:26

## 2021-08-26 RX ADMIN — LOSARTAN POTASSIUM 100 MILLIGRAM(S): 100 TABLET, FILM COATED ORAL at 06:05

## 2021-08-26 RX ADMIN — Medication 1 MILLIGRAM(S): at 11:12

## 2021-08-26 RX ADMIN — Medication 12.5 MILLIGRAM(S): at 06:05

## 2021-08-26 RX ADMIN — Medication 4 GRAM(S): at 17:10

## 2021-08-26 RX ADMIN — ATORVASTATIN CALCIUM 40 MILLIGRAM(S): 80 TABLET, FILM COATED ORAL at 11:12

## 2021-08-26 RX ADMIN — Medication 650 MILLIGRAM(S): at 13:51

## 2021-08-26 RX ADMIN — Medication 81 MILLIGRAM(S): at 11:12

## 2021-08-26 RX ADMIN — Medication 650 MILLIGRAM(S): at 18:54

## 2021-08-26 RX ADMIN — Medication 50 GRAM(S): at 00:54

## 2021-08-26 RX ADMIN — Medication 40 MILLIEQUIVALENT(S): at 00:54

## 2021-08-26 RX ADMIN — SODIUM CHLORIDE 1000 MILLILITER(S): 9 INJECTION INTRAMUSCULAR; INTRAVENOUS; SUBCUTANEOUS at 00:55

## 2021-08-26 RX ADMIN — ENOXAPARIN SODIUM 40 MILLIGRAM(S): 100 INJECTION SUBCUTANEOUS at 11:13

## 2021-08-26 RX ADMIN — Medication 650 MILLIGRAM(S): at 21:42

## 2021-08-26 RX ADMIN — Medication 1 DROP(S): at 11:13

## 2021-08-26 NOTE — CHART NOTE - NSCHARTNOTEFT_GEN_A_CORE
Saw and examined the patient at bedside  Patient was comfortable  Reports 3rd episode of syncope ( First in January 2020, Second in April 2021)  He stood up, walked and was going to the bathroom, before he urinated he felt weird and passed out  After the syncope he had urinary incontinence   No chest pains or shortness of breath prior to fall    Every time the patient falls his labs show Hypomagnesemia. Upon this admission his Magnesium was 1.1, follow up with neurology if there is any correlation.    Neurology recommendations appreciated, They recommended CTA Neck for stenosis, however Bilateral Carotid Duplex Arterial was already performed   Follow up Echocardiography.   Follow up Cardiology recommendations. May need loop recorder ? Saw and examined the patient at bedside  Patient was comfortable  Reports 3rd episode of syncope ( First in January 2020, Second in April 2021)  He stood up, walked and was going to the bathroom, before he urinated he felt weird and passed out  After the syncope he had urinary incontinence   No chest pains or shortness of breath prior to fall    Every time the patient falls his labs show Hypomagnesemia. Upon this admission his Magnesium was 1.1, follow up with neurology if there is any correlation.  Repeat magnesium today: 1.4, replete given again.     Neurology recommendations appreciated, They recommended CTA Neck for stenosis, however Bilateral Carotid Duplex Arterial was already performed   Follow up Echocardiography.   Follow up Cardiology recommendations. May need loop recorder ?

## 2021-08-26 NOTE — CONSULT NOTE ADULT - ASSESSMENT
1] Syncope 1] Syncope - probably vasovagal mechanism.  R/O Seizure  - Neurology input appreciated    2] Gastroenteritis  - Continue to monitor  - IV fluids if he continues to have diarrhea    3] HTN  - Hold Benicar (home med for HTN) at this tme  - Monitor BP closely  - If he continues to have diarrhea, hold HCTZ too.  - Consider Amlodipine if BP is high off Benicar and HCTZ (if he continues to have diarrhea)    Plan discuss with Nursing Staff/House Staff  Will follow     Bin Steele MD  [phone] Office

## 2021-08-26 NOTE — CONSULT NOTE ADULT - SUBJECTIVE AND OBJECTIVE BOX
HPI: 66y Male with PMHx of HLD, B/L carotid stenosis, HTN, hypothyroidism presents with CC of syncope. Patient was at home today, went to go to the bathroom, prior to sitting, patient felt diaphoretic, lightheadedness, dizziness, had a syncopal episode lasting for a couple minutes. Wife found patient in the corner, having incontinence, no seizure like activity at the time but did noticed eyes rolled back. Patient has been having loose stools for 2-3 days and has been diagnosed with diverticulosis, not currently on ABX. Pt also stated he has had increased level of stress in the last 2 days. Denies fevers, chills, chest pain, SOB, abdominal pain, N/V/D. Patient after arrival to the ED had another syncopal episode, but this time there was associated twitching of the right upper and left lower extremities. Patient became diaphoretic, per chart, HR did drop to 50, sinus, went up after less than a minute to the 60s.   VS wnl in the ED. Labs significant for Na of 130, K of 3.4 and Mag of 1.1. CTH showed No acute intracranial hemorrhage, mass-effect or midline shift.      T(C): 37.2 (08-25-21 @ 21:46), Max: 37.2 (08-25-21 @ 21:46)  HR: 94 (08-25-21 @ 21:46) (94 - 94)  BP: 122/73 (08-25-21 @ 21:46) (122/73 - 122/73)  RR: 18 (08-25-21 @ 21:46) (18 - 18)  SpO2: 95% (08-25-21 @ 21:46) (95% - 95%)    PAST MEDICAL & SURGICAL HISTORY:  Hypertension    High cholesterol    Hypothyroidism    GERD (gastroesophageal reflux disease)    H/O vasectomy        FAMILY HISTORY:      SOCIAL HISTORY:   Patient lives with *** at ***.   Smoking status:  Drinking:  Drug Use:     ROS: ***  Constitutional: No fever, weight loss or fatigue  Eyes: No eye pain, visual disturbances, or discharge  ENMT:  No difficulty hearing, tinnitus; No sinus or throat pain  Neck: No pain or stiffness  Respiratory: No cough, wheezing, chills or hemoptysis  Cardiovascular: No chest pain, palpitations, shortness of breath, or leg swelling  Gastrointestinal: No abdominal pain. No nausea, vomiting or hematemesis; No diarrhea or constipation. Nohematochezia.  Genitourinary: No dysuria, frequency, hematuria or incontinence  Neurological: As per HPI  Skin: No itching, burning, rashes or lesions   Endocrine: No heat or cold intolerance; No hair loss  Musculoskeletal: No joint pain or swelling; No muscle, back or extremity pain  Heme/Lymph: No easy bruising or bleeding gums    MEDICATIONS  (STANDING):  aspirin enteric coated 81 milliGRAM(s) Oral daily  atorvastatin Oral Tab/Cap - Peds 40 milliGRAM(s) Oral daily  enoxaparin Injectable 40 milliGRAM(s) SubCutaneous daily  folic acid 1 milliGRAM(s) Oral daily  hydrochlorothiazide 12.5 milliGRAM(s) Oral daily  latanoprost 0.005% Ophthalmic Solution 1 Drop(s) Both EYES at bedtime  levothyroxine 175 MICROGram(s) Oral daily  losartan 100 milliGRAM(s) Oral daily  omega-3-Acid Ethyl Esters 4 Gram(s) Oral daily  timolol 0.25% Solution 1 Drop(s) Both EYES daily    MEDICATIONS  (PRN):    Allergies    penicillin (Hives)    Intolerances      Vital Signs Last 24 Hrs  T(C): 37.2 (25 Aug 2021 21:46), Max: 37.2 (25 Aug 2021 21:46)  T(F): 99 (25 Aug 2021 21:46), Max: 99 (25 Aug 2021 21:46)  HR: 94 (25 Aug 2021 21:46) (94 - 94)  BP: 122/73 (25 Aug 2021 21:46) (122/73 - 122/73)  BP(mean): --  RR: 18 (25 Aug 2021 21:46) (18 - 18)  SpO2: 95% (25 Aug 2021 21:46) (95% - 95%)    Physical exam:  Constitutional: No acute distress, conversant  Eyes: Anicteric sclerae, moist conjunctivae, see below for CNs  Neck: trachea midline, FROM, supple, no thyromegaly or lymphadenopathy  Cardiovascular: Regular rate and rhythm, no murmurs, rubs, or gallops. No carotid bruits.   Pulmonary: Anterior breath sounds clear bilaterally, no crackles or wheezing. No use of accessory muscles  GI: Abdomen soft, non-distended, non-tender  Extremities: Radial and DP pulses +2, no edema    Neurologic:  -Mental status: Awake, alert, oriented to person, place, and time. Speech is fluent with intact naming, repetition, and comprehension, no dysarthria. Recent and remote memory intact. Follows commands. Attention/concentration intact. Fund of knowledge appropriate.  -Cranial nerves:   II: Visual fields are full to confrontation.  III, IV, VI: Extraocular movements are intact without nystagmus. Pupils equally round and reactive to light  V:  Facial sensation V1-V3 equal and intact   VII: Face is symmetric with normal eye closure and smile  VIII: Hearing is bilaterally intact to finger rub  IX, X: Uvula is midline and soft palate rises symmetrically  XI: Head turning and shoulder shrug are intact.  XII: Tongue protrudes midline  Motor: Normal bulk and tone. No pronator drift. Strength bilateral upper extremity 5/5, bilateral lower extremities 5/5.  Rapid alternating movements intact and symmetric  Sensation: Intact to light touch bilaterally. No neglect or extinction on double simultaneous testing.  Coordination: No dysmetria on finger-to-nose and heel-to-shin bilaterally  Reflexes: Downgoing toes bilaterally   Gait: Narrow gait and steady    NIHSS: **** ASPECT Score: ***** ICH Score: ****** (GCS)    Fingerstick Blood Glucose: CAPILLARY BLOOD GLUCOSE      POCT Blood Glucose.: 107 mg/dL (25 Aug 2021 22:42)    LABS:                        12.7   5.54  )-----------( 151      ( 25 Aug 2021 22:51 )             35.6     08-25    130<L>  |  91<L>  |  15  ----------------------------<  101<H>  3.4<L>   |  20  |  1.3    Ca    9.0      25 Aug 2021 22:51  Mg     1.1     08-25    TPro  6.6  /  Alb  4.4  /  TBili  0.9  /  DBili  x   /  AST  36  /  ALT  18  /  AlkPhos  53  08-25    PT/INR - ( 25 Aug 2021 22:51 )   PT: 11.70 sec;   INR: 1.02 ratio         PTT - ( 25 Aug 2021 22:51 )  PTT:30.1 sec  CARDIAC MARKERS ( 25 Aug 2021 22:51 )  x     / <0.01 ng/mL / x     / x     / x              RADIOLOGY & ADDITIONAL STUDIES:      -----------------------------------------------------------------------------------------------------------------  IV-tPA (Y/N):    ***                              Bolus time:    Alteplase Dose Verification w/ RN:  Reason IV-tPA not given: ***   HPI: 66y Male with PMHx of HLD, B/L carotid stenosis, HTN, hypothyroidism presents with CC of syncope. Patient was at home today, went to go to the bathroom, prior to sitting, patient felt diaphoretic, lightheadedness, dizziness, had a syncopal episode lasting for a couple minutes. Wife found patient in the corner, having incontinence, no seizure like activity at the time but did noticed eyes rolled back. Patient has been having loose stools for 2-3 days and has been diagnosed with diverticulosis, not currently on ABX. Pt also stated he has had increased level of stress in the last 2 days. Denies fevers, chills, chest pain, SOB, abdominal pain, N/V/D. Patient after arrival to the ED had another syncopal episode, but this time there was associated twitching of the right upper and left lower extremities. Patient became diaphoretic, per chart, HR did drop to 50, sinus, went up after less than a minute to the 60s.   VS wnl in the ED. Labs significant for Na of 130, K of 3.4 and Mag of 1.1. CTH showed No acute intracranial hemorrhage, mass-effect or midline shift.      T(C): 37.2 (08-25-21 @ 21:46), Max: 37.2 (08-25-21 @ 21:46)  HR: 94 (08-25-21 @ 21:46) (94 - 94)  BP: 122/73 (08-25-21 @ 21:46) (122/73 - 122/73)  RR: 18 (08-25-21 @ 21:46) (18 - 18)  SpO2: 95% (08-25-21 @ 21:46) (95% - 95%)    PAST MEDICAL & SURGICAL HISTORY:  Hypertension    High cholesterol    Hypothyroidism    GERD (gastroesophageal reflux disease)    H/O vasectomy        FAMILY HISTORY:      SOCIAL HISTORY:   Patient lives with Wife  Drinking: no  Drug Use: used to drink cannabis    ROS:   Constitutional: No fever, weight loss or fatigue  Eyes: No eye pain, visual disturbances, or discharge  ENMT:  No difficulty hearing, tinnitus; No sinus or throat pain  Neck: No pain or stiffness  Respiratory: No cough, wheezing, chills or hemoptysis  Cardiovascular: No chest pain, palpitations, shortness of breath, or leg swelling  Gastrointestinal: No diarrhea   Genitourinary: No dysuria, frequency, hematuria or incontinence  Neurological: As per HPI  Skin: No itching, burning, rashes or lesions   Endocrine: No heat or cold intolerance; No hair loss  Musculoskeletal: No joint pain or swelling; No muscle, back or extremity pain  Heme/Lymph: No easy bruising or bleeding gums    MEDICATIONS  (STANDING):  aspirin enteric coated 81 milliGRAM(s) Oral daily  atorvastatin Oral Tab/Cap - Peds 40 milliGRAM(s) Oral daily  enoxaparin Injectable 40 milliGRAM(s) SubCutaneous daily  folic acid 1 milliGRAM(s) Oral daily  hydrochlorothiazide 12.5 milliGRAM(s) Oral daily  latanoprost 0.005% Ophthalmic Solution 1 Drop(s) Both EYES at bedtime  levothyroxine 175 MICROGram(s) Oral daily  losartan 100 milliGRAM(s) Oral daily  omega-3-Acid Ethyl Esters 4 Gram(s) Oral daily  timolol 0.25% Solution 1 Drop(s) Both EYES daily    MEDICATIONS  (PRN):    Allergies    penicillin (Hives)    Intolerances      Vital Signs Last 24 Hrs  T(C): 37.2 (25 Aug 2021 21:46), Max: 37.2 (25 Aug 2021 21:46)  T(F): 99 (25 Aug 2021 21:46), Max: 99 (25 Aug 2021 21:46)  HR: 94 (25 Aug 2021 21:46) (94 - 94)  BP: 122/73 (25 Aug 2021 21:46) (122/73 - 122/73)  BP(mean): --  RR: 18 (25 Aug 2021 21:46) (18 - 18)  SpO2: 95% (25 Aug 2021 21:46) (95% - 95%)    Physical exam:  Constitutional: No acute distress, conversant    Neurologic:  -Mental status: Awake, alert, oriented to person, place, and time. Speech is fluent with intact naming, repetition, and comprehension, no dysarthria. Recent and remote memory intact. Follows commands. Attention/concentration intact. Fund of knowledge appropriate.  -Cranial nerves:   II: Visual fields are full to confrontation.  III, IV, VI: Extraocular movements are intact without nystagmus. Pupils equally round and reactive to light  V:  Facial sensation V1-V3 equal and intact   VII: Face is symmetric with normal eye closure and smile  VIII: Hearing is bilaterally intact to finger rub  IX, X: Uvula is midline and soft palate rises symmetrically  XI: Head turning and shoulder shrug are intact.  XII: Tongue protrudes midline  Motor: Normal bulk and tone. No pronator drift. Strength bilateral upper extremity 5/5, bilateral lower extremities 5/5.  Rapid alternating movements intact and symmetric  Sensation: Intact to light touch bilaterally. No neglect or extinction on double simultaneous testing.  Coordination: No dysmetria on finger-to-nose and heel-to-shin bilaterally  Reflexes: Downgoing toes bilaterally   Gait: Narrow gait and steady        Fingerstick Blood Glucose: CAPILLARY BLOOD GLUCOSE    POCT Blood Glucose.: 107 mg/dL (25 Aug 2021 22:42)    LABS:                        12.7   5.54  )-----------( 151      ( 25 Aug 2021 22:51 )             35.6     08-25    130<L>  |  91<L>  |  15  ----------------------------<  101<H>  3.4<L>   |  20  |  1.3    Ca    9.0      25 Aug 2021 22:51  Mg     1.1     08-25    TPro  6.6  /  Alb  4.4  /  TBili  0.9  /  DBili  x   /  AST  36  /  ALT  18  /  AlkPhos  53  08-25    PT/INR - ( 25 Aug 2021 22:51 )   PT: 11.70 sec;   INR: 1.02 ratio         PTT - ( 25 Aug 2021 22:51 )  PTT:30.1 sec  CARDIAC MARKERS ( 25 Aug 2021 22:51 )  x     / <0.01 ng/mL / x     / x     / x              RADIOLOGY & ADDITIONAL STUDIES:  CT Head No Cont (08.25.21 @ 23:58)   No acute intracranial hemorrhage, mass-effect or midline shift.  In view of the history of seizure, MRI of the brain is recommended for further evaluation if clinically warranted and not contra-indicated in this patient.

## 2021-08-26 NOTE — CONSULT NOTE ADULT - ATTENDING COMMENTS
I have personally seen and examined this patient.  I have fully participated in the care of this patient.  I have reviewed all pertinent clinical information, including history, physical exam, plan and note. Patient is alert and oriented and exam is normal. Reported few episodes of near syncope after ED admission. No tongue biting. More likely syncope. Routine EEG, CTA and cardiac work up.   I have reviewed all pertinent clinical information and reviewed all relevant imaging and diagnostic studies personally.  Recommendations as above.  Agree with above assessment except as noted.

## 2021-08-26 NOTE — H&P ADULT - ATTENDING COMMENTS
65 YO man with HLD, B/L carotid stenosis, HTN, hypothyroidism admitted with recurrent syncope     Pt seen and examined in ER- very comfortable; no complaints    Chart reviewed- agree with above    full neuro workup as per neurology    carotid duplex    fall/seizure precautions    OOB to chair    tele    cardiology eval for workup    conider loop recorder

## 2021-08-26 NOTE — H&P ADULT - ASSESSMENT
# Recurrent syncope   - f/u orthostatic VS  -admit to tele   - CT Head -ve  -f/u REEG   - f/u TSH  - obtain 2D echo and carotid duplex   - Electrolyte derangement POA- s/p repletion   - pt counselled to stop smoking marijuana    # Hypokalemia  #Hypomagnesimia  - repleted   f/u repeat in am    # HTN  - c/w losartan, HCTZ monitor lytes and renal function    # DLD  - c/w statins    # Hypotyroidism  - c/w synthroid, f/u TSH    DVT PPx: Lovenox 40 mg s/c  GI PPX: not indicated  Diet: DASH  Activity: Increase as tolerated  Dispo: from home, no needs, possible d/c in am if w/up is -ve  Code Status: full code 66 Y M with PMH of HLD, B/L carotid stenosis, HTN, hypothyroidism presents with CC of syncope. Patient was at home today, went to go to the bathroom, prior to sitting, patient felt diaphoretic, lightheadedness, dizziness, had a syncopal episode lasting for a couple minutes. Wife found patient in the corner, having incontinence, no seizure like activity at the time but did noticed eyes rolled back. Patient has been having loose stools for 2-3 days and has been diagnosed with diverticulosis, not currently on ABX. Pt also stated he has had increased level of stress in the last 2 days. Denies fevers, chills, chest pain, SOB, abdominal pain, N/V/D. Patient after arrival to the ED had another syncopal episode, but this time there was associated twitching of the right upper and left lower extremities. Patient became diaphoretic, per chart, HR did drop to 50, sinus, went up after less than a minute to the 60s.     VS wnl in the ED. Labs significant for Na of 130, K of 3.4 and Mag of 1.1. CTH showed No acute intracranial hemorrhage, mass-effect or midline shift.    # Recurrent syncope   - f/u orthostatic   -admit to tele   -CT Head -ve  -f/u REEG   - f/u TSH  - obtain 2D echo and carotid duplex   - Electrolyte derangement POA- s/p repletion   - pt denies drug use   -f/u neuro recs  -f/u cardio recs     #Hypokalemia  #Hypomagnesemia  - repleted  - f/u repeat in am    # HTN  - c/w losartan, HCTZ    # DLD  - c/w statins    # Hypothyroidism  - c/w synthroid, f/u TSH    DVT PPx: Lovenox 40 mg s/c  GI PPX: not indicated  Diet: DASH  Activity: Increase as tolerated  Code Status: full code

## 2021-08-26 NOTE — CONSULT NOTE ADULT - SUBJECTIVE AND OBJECTIVE BOX
NEUROLOGY CONSULT    HPI:  66 Y M with PMH of HLD, B/L carotid stenosis, HTN, hypothyroidism presents with CC of syncope. Patient was at home today, went to go to the bathroom, prior to sitting, patient felt diaphoretic, lightheadedness, dizziness, had a syncopal episode lasting for a couple minutes. Wife found patient in the corner, having urinary incontinence, no seizure like activity at the time but did noticed eyes rolled back, no tongue biting, no post-ictal confusion was coherent and able to recall what happened before syncope. Patient has been having loose stools for 2-3 days and has been diagnosed with diverticulosis, not currently on ABX. Pt also stated he has had increased level of stress in the last 2 days. Denies fevers, chills, chest pain, SOB, abdominal pain, N/V/D. Patient after arrival to the ED had a near syncopal episode, but this time there was associated twitching of the right upper and left lower extremities. Patient became diaphoretic, per chart, HR did drop to 50, sinus, went up after less than a minute to the 60s.     VS wnl in the ED. Labs significant for Na of 130, K of 3.4 and Mag of 1.1. CT head showed No acute intracranial hemorrhage, mass-effect or midline shift.    Neurology was consulted for evaluation of syncope, concern for seizure. Patient was seen and examined at bedside. Patient confirms history as above. Reports having 2 similar episodes prior where he was worked up fully from a cardiac standpoint (did not specify tests) and they were both attributed to dehydration and electrolyte abnormalities. No complaints at this time except continues to report loose stools x3 today. Denies any tongue biting, post-ictal confusion. He is able to fully recollect sequence of events after both syncopal/near-syncopal episodes.       MEDICATIONS  Home Medications:  Aspir 81 oral delayed release tablet: 1 tab(s) orally once a day (26 Aug 2021 03:20)  Benicar HCT 40 mg-12.5 mg oral tablet: 1 tab(s) orally once a day (05 Jan 2020 04:56)  folic acid 1 mg oral tablet: 1 tab(s) orally once a day (26 Aug 2021 03:20)  Lipitor 40 mg oral tablet: 1 tab(s) orally once a day (05 Jan 2020 04:56)  Lovaza: 4000 milligram(s) orally once a day (26 Aug 2021 03:20)  Lumigan 0.01% ophthalmic solution: 1 drop(s) to each affected eye once a day (in the evening) (05 Jan 2020 04:56)  Synthroid 175 mcg (0.175 mg) oral tablet: 1 tab(s) orally once a day (05 Jan 2020 04:56)  timolol hemihydrate 0.5% ophthalmic solution: 1 drop(s) to each affected eye 2 times a day (05 Jan 2020 04:56)  Vitamin D2 50 mcg (2000 intl units) oral capsule: 1 cap(s) orally once a day (26 Aug 2021 03:21)    MEDICATIONS  (STANDING):  aspirin enteric coated 81 milliGRAM(s) Oral daily  atorvastatin Oral Tab/Cap - Peds 40 milliGRAM(s) Oral daily  enoxaparin Injectable 40 milliGRAM(s) SubCutaneous daily  folic acid 1 milliGRAM(s) Oral daily  hydrochlorothiazide 12.5 milliGRAM(s) Oral daily  latanoprost 0.005% Ophthalmic Solution 1 Drop(s) Both EYES at bedtime  levothyroxine 175 MICROGram(s) Oral daily  omega-3-Acid Ethyl Esters 4 Gram(s) Oral daily  timolol 0.25% Solution 1 Drop(s) Both EYES daily    MEDICATIONS  (PRN):  acetaminophen   Tablet .. 650 milliGRAM(s) Oral every 6 hours PRN Temp greater or equal to 38.5C (101.3F)      FAMILY HISTORY:    SOCIAL HISTORY: negative for tobacco, alcohol, or ilicit drug use.    Allergies    penicillin (Hives)    Intolerances        PHYSICAL EXAMINATION:  GEN: NAD, pleasant, cooperative  CVS: RRR, no carotid bruit  CHEST: No signs of  distress, on room air  ABD: Soft, NTTP  NEURO:     MENTAL STATUS: AAOx3    LANG/SPEECH: Fluent, intact naming, repetition & comprehension    CRANIAL NERVES:    II: Pupils equal and reactive, no RAPD, normal visual field and fundus    III, IV, VI: EOM intact, no gaze preference or deviation    V: normal    VII: no facial asymmetry    VIII: normal hearing to speech    MOTOR: 5/5 in both upper and lower extremities    REFLEXES: 2/4 throughout, bilateral flexor planters    SENSORY: Normal to touch, temperature & pin prick in all extremiteis    COORD: Normal finger to nose and heel to shin, no tremor, no dysmetria  LABS:                        12.4   4.21  )-----------( 129      ( 26 Aug 2021 04:30 )             35.1     08-26    130<L>  |  93<L>  |  16  ----------------------------<  100<H>  4.2   |  23  |  1.1    Ca    8.7      26 Aug 2021 04:30  Mg     1.4     08-26    TPro  6.4  /  Alb  4.2  /  TBili  0.9  /  DBili  x   /  AST  34  /  ALT  19  /  AlkPhos  51  08-26    Hemoglobin A1C:   Vitamin B12   PT/INR - ( 25 Aug 2021 22:51 )   PT: 11.70 sec;   INR: 1.02 ratio         PTT - ( 25 Aug 2021 22:51 )  PTT:30.1 sec  CAPILLARY BLOOD GLUCOSE      POCT Blood Glucose.: 107 mg/dL (25 Aug 2021 22:42)              Microbiology:      RADIOLOGY, EKG AND ADDITIONAL TESTS: Reviewed.

## 2021-08-26 NOTE — H&P ADULT - NSHPPHYSICALEXAM_GEN_ALL_CORE
GENERAL: NAD, speaks in full sentences, no signs of respiratory distress  NECK: Supple, No JVD  CHEST/LUNG: Clear to auscultation bilaterally; No wheeze; No crackles; No accessory muscles used  HEART: Regular rate and rhythm; No murmurs;   ABDOMEN: Soft, Nontender, Nondistended; Bowel sounds present; No guarding  EXTREMITIES:  2+ Peripheral Pulses, No cyanosis or edema  PSYCH: AAOx3

## 2021-08-26 NOTE — ED PROVIDER NOTE - PHYSICAL EXAMINATION
VITALS: Reviewed  CONSTITUTIONAL: +diaphoretic, well developed, well nourished, in no acute distress, speaking in full sentences, nontoxic appearing  SKIN: warm, dry, no rash  HEAD: normocephalic, atraumatic  EYES: PERRL, EOMI, no conjunctival erythema, sclera clear  ENT: patent airway, moist mucous membranes  NECK: supple, no masses  CV:  regular rate, regular rhythm, 2+ radial pulses bilaterally  RESP: no wheezes, no rales, no rhonchi, normal work of breathing  ABD: soft, nontender, nondistended, no rebound, no guarding  MSK: normal ROM, no cyanosis, no edema  NEURO: alert, oriented x3  PSYCH: cooperative, appropriate

## 2021-08-26 NOTE — ED PROVIDER NOTE - PROGRESS NOTE DETAILS
PP: Patient became diaphoretic, called by nurse to bedside. Evaluated patient again. Patient's HR did drop to 50, sinus, went up after less than a minute to the 60s. Patient was feeling better.

## 2021-08-26 NOTE — CONSULT NOTE ADULT - ASSESSMENT
66y Male with PMHx of HLD, B/L carotid stenosis, HTN, hypothyroidism presents with CC of syncope. Patient was at home today, went to go to the bathroom, prior to sitting, patient felt diaphoretic, lightheadedness, dizziness, had a syncopal episode lasting for a couple minutes. Wife found patient in the corner, having incontinence, no seizure like activity at the time but did noticed eyes rolled back. Patient has been having loose stools for 2-3 days and has been diagnosed with diverticulosis, not currently on ABX. Pt also stated he has had increased level of stress in the last 2 days. Denies fevers, chills, chest pain, SOB, abdominal pain, N/V/D. Patient after arrival to the ED had another syncopal episode, but this time there was associated twitching of the right upper and left lower extremities. Patient became diaphoretic, per chart, HR did drop to 50, sinus, went up after less than a minute to the 60s.   VS wnl in the ED. Labs significant for Na of 130, K of 3.4 and Mag of 1.1. CTH showed No acute intracranial hemorrhage, mass-effect or midline shift.    Impression:  Likely convulsive syncope, Etiology likley cardiac, however need to r/o seizures  -REEG  - 66y Male with PMHx of HLD, B/L carotid stenosis, HTN, hypothyroidism presents with CC of syncope. Patient was at home today, With diarrhea since yesterday, went to go to the bathroom, prior to sitting, patient felt diaphoretic, lightheadedness, dizziness, had 2 syncopal episodes lasting for few seconds, preceded by dizziness and lightheadedness. Second episode was associated with bradycardia with HR dropping 49. Patient had 2 similar episodes before, were attributed to dehydration and previous cannabis use.    Impression:  Likely convulsive syncope, Etiology likely cardiac, however need to r/o seizures  -REEG  -CTA Neck to assess for carotid stenosis  -Cardiac workup     Discussed with Neurology Attending

## 2021-08-26 NOTE — CONSULT NOTE ADULT - ASSESSMENT
66 Y M with PMH of HLD, B/L carotid stenosis, HTN, hypothyroidism, syncope x2 attributed to dehydration and electrolyte disturbances presented for syncope x1 with prodromal dizziness/lightheadedness and diaphoresis and a subsequent episode of near syncope with muscle twitching RUE and LLE in the setting of recent diarrheal illness and hypomagnesemia with CT of the head negative for acute pathology.    Impression:  Syncope and transient seizure-like activity secondary to dehydration and hypomagnesemia versus cardiac, less likely seizure  History of bilateral carotid stenosis  Hypomagnesemia likely secondary to diarrhea    Recommendations:  Check rEEG  Check Duplex of the carotids: Bilateral internal carotid arteries show 20-39% (mild) stenosis  Continue cardiac workup and evaluation    Thank you for the courtesy of this consult. Will continue to follow. Please contact us for any questions or concerns.

## 2021-08-26 NOTE — ED PROVIDER NOTE - ATTENDING CONTRIBUTION TO CARE
Patient is BIB wife for evaluation of syncope at home, and had another episode of syncope with eyes rolling and hand shaking in ED, no incontinence. Patient denies cp/sob/abd pain.   Vitals reviewed.   Lungs: CTA,   abd: +BS, NT, ND, soft,   CNS: awake, alert, o x 3, no focal neurologic deficits.   A/P: Syncope/seizure,   labs, imaging, reevaluation.

## 2021-08-26 NOTE — ED PROVIDER NOTE - NS ED ROS FT
Review of Systems:  CONSTITUTIONAL - No fever  SKIN - No rash  HEMATOLOGIC - No abnormal bleeding or bruising  RESPIRATORY - No shortness of breath, No cough  CARDIAC -No chest pain, No palpitations  GI - No abdominal pain, No nausea, No vomiting  - No dysuria, frequency, hematuria  MUSCULOSKELETAL - No joint paint, No swelling, No back pain  NEUROLOGIC - No numbness, No focal weakness, No headache  All other systems negative, unless specified in HPI

## 2021-08-26 NOTE — CONSULT NOTE ADULT - SUBJECTIVE AND OBJECTIVE BOX
Patient was seen and examined by me earlier this evening on 3C.  EMR reviewed.    Patient is a 66y old  Male who presents with a chief complaint of syncope    REASON FOR CONSULT     HPI:  66 Y M with PMH of HLD, B/L carotid stenosis, HTN, hypothyroidism presents with CC of syncope. Patient was at home today, went to go to the bathroom, prior to sitting, patient felt diaphoretic, lightheadedness, dizziness, had a syncopal episode lasting for a couple minutes. Wife found patient in the corner, having incontinence, no seizure like activity at the time but did noticed eyes rolled back. Patient has been having loose stools for 2-3 days and has been diagnosed with diverticulosis, not currently on ABX. Pt also stated he has had increased level of stress in the last 2 days. Denies fevers, chills, chest pain, SOB, abdominal pain, N/V/D. Patient after arrival to the ED had another syncopal episode, but this time there was associated twitching of the right upper and left lower extremities. Patient became diaphoretic, per chart, HR did drop to 50, sinus, went up after less than a minute to the 60s.     VS wnl in the ED. Labs significant for Na of 130, K of 3.4 and Mag of 1.1. CTH showed No acute intracranial hemorrhage, mass-effect or midline shift.   (26 Aug 2021 02:17)      PAST MEDICAL & SURGICAL HISTORY:  Hypertension    High cholesterol    Hypothyroidism    GERD (gastroesophageal reflux disease)    H/O vasectomy            SOCIAL HISTORY:     FAMILY HISTORY:    penicillin (Hives)      MEDICATIONS  (STANDING):  aspirin enteric coated 81 milliGRAM(s) Oral daily  atorvastatin Oral Tab/Cap - Peds 40 milliGRAM(s) Oral daily  enoxaparin Injectable 40 milliGRAM(s) SubCutaneous daily  folic acid 1 milliGRAM(s) Oral daily  hydrochlorothiazide 12.5 milliGRAM(s) Oral daily  latanoprost 0.005% Ophthalmic Solution 1 Drop(s) Both EYES at bedtime  levothyroxine 175 MICROGram(s) Oral daily  omega-3-Acid Ethyl Esters 4 Gram(s) Oral daily  timolol 0.25% Solution 1 Drop(s) Both EYES daily    MEDICATIONS  (PRN):  acetaminophen   Tablet .. 650 milliGRAM(s) Oral every 6 hours PRN Temp greater or equal to 38.5C (101.3F)      Vital Signs Last 24 Hrs  T(C): 37.9 (26 Aug 2021 22:24), Max: 38.2 (26 Aug 2021 12:39)  T(F): 100.2 (26 Aug 2021 22:24), Max: 100.8 (26 Aug 2021 12:39)  HR: 67 (26 Aug 2021 22:24) (67 - 95)  BP: 156/72 (26 Aug 2021 22:24) (143/84 - 161/83)  BP(mean): --  RR: 18 (26 Aug 2021 22:24) (18 - 18)  SpO2: 97% (26 Aug 2021 23:20) (97% - 98%) I&O's Detail    PHYSICAL EXAM:          REVIEW OF SYSTEMS            ECG:  ECHOCARDIOGRAM:  RADIOLOGY & ADDITIONAL STUDIES:      LABS:                        12.4   4.21  )-----------( 129      ( 26 Aug 2021 04:30 )             35.1     08-26    130<L>  |  93<L>  |  16  ----------------------------<  100<H>  4.2   |  23  |  1.1    Ca    8.7      26 Aug 2021 04:30  Mg     1.4     08-26    TPro  6.4  /  Alb  4.2  /  TBili  0.9  /  DBili  x   /  AST  34  /  ALT  19  /  AlkPhos  51  08-26    CARDIAC MARKERS ( 25 Aug 2021 22:51 )  x     / <0.01 ng/mL / x     / x     / x          PT/INR - ( 25 Aug 2021 22:51 )   PT: 11.70 sec;   INR: 1.02 ratio         PTT - ( 25 Aug 2021 22:51 )  PTT:30.1 sec  I&O's Summary     Patient was seen and examined by me earlier this evening on 3C.  EMR reviewed.    Patient is a 66y old  Male who presents with a chief complaint of syncope    REASON FOR CONSULT     HPI:  Mr. Reymundo Reed is an 66-year-old Causasian male with a past medical history of PVC's, Carotid Disease (no hemodynamically significant stenosis), Hypertension, Hypothyroidism and Diverticulosis.    He presented at [facility] because of syncope.  He has had soft to watery diarrhea all day of Tuesday (August 24, 2021) and Wednesday (August 25, 2021).  He states he started having diarrhea after he had this particular meal on Monday night (Hamburger arugula salad).   On day of admission, he started feeling diaphoretic while he was about to have a bowel movement.  He subsequently passed out prior to sitting on toilet in the bathroom.  He was found in the corner of the bathroom by his wife.  He was found incontinent (urine) on the floor by the time his wife found him.    On telemetry, he appears comfortable in bed.  He offers no other complaints.  He denies any chest pain, shortness of breath and palpitations.  ____________________________________________________________________________  House Staff Admission Note  66 Y M with PMH of HLD, B/L carotid stenosis, HTN, hypothyroidism presents with CC of syncope. Patient was at home today, went to go to the bathroom, prior to sitting, patient felt diaphoretic, lightheadedness, dizziness, had a syncopal episode lasting for a couple minutes. Wife found patient in the corner, having incontinence, no seizure like activity at the time but did noticed eyes rolled back. Patient has been having loose stools for 2-3 days and has been diagnosed with diverticulosis, not currently on ABX. Pt also stated he has had increased level of stress in the last 2 days. Denies fevers, chills, chest pain, SOB, abdominal pain, N/V/D. Patient after arrival to the ED had another syncopal episode, but this time there was associated twitching of the right upper and left lower extremities. Patient became diaphoretic, per chart, HR did drop to 50, sinus, went up after less than a minute to the 60s.     VS wnl in the ED. Labs significant for Na of 130, K of 3.4 and Mag of 1.1. CTH showed No acute intracranial hemorrhage, mass-effect or midline shift.   (26 Aug 2021 02:17)      PAST MEDICAL & SURGICAL HISTORY:  Hypertension    High cholesterol    Hypothyroidism    GERD (gastroesophageal reflux disease)    H/O vasectomy            SOCIAL HISTORY:     FAMILY HISTORY:    penicillin (Hives)      MEDICATIONS  (STANDING):  aspirin enteric coated 81 milliGRAM(s) Oral daily  atorvastatin Oral Tab/Cap - Peds 40 milliGRAM(s) Oral daily  enoxaparin Injectable 40 milliGRAM(s) SubCutaneous daily  folic acid 1 milliGRAM(s) Oral daily  hydrochlorothiazide 12.5 milliGRAM(s) Oral daily  latanoprost 0.005% Ophthalmic Solution 1 Drop(s) Both EYES at bedtime  levothyroxine 175 MICROGram(s) Oral daily  omega-3-Acid Ethyl Esters 4 Gram(s) Oral daily  timolol 0.25% Solution 1 Drop(s) Both EYES daily    MEDICATIONS  (PRN):  acetaminophen   Tablet .. 650 milliGRAM(s) Oral every 6 hours PRN Temp greater or equal to 38.5C (101.3F)      Vital Signs Last 24 Hrs  T(C): 37.9 (26 Aug 2021 22:24), Max: 38.2 (26 Aug 2021 12:39)  T(F): 100.2 (26 Aug 2021 22:24), Max: 100.8 (26 Aug 2021 12:39)  HR: 67 (26 Aug 2021 22:24) (67 - 95)  BP: 156/72 (26 Aug 2021 22:24) (143/84 - 161/83)  BP(mean): --  RR: 18 (26 Aug 2021 22:24) (18 - 18)  SpO2: 97% (26 Aug 2021 23:20) (97% - 98%) I&O's Detail    PHYSICAL EXAM:  Not in apparent distress  Alert, oriented x 3  No JVD; regular rhythm; nl S1S2  Bilateral breath sounds  Abdomen soft  No edema  Moving all extremities    REVIEW OF SYSTEMS: Negative except as stated in HPI.    ECG: Sinus Rhythm    ECHOCARDIOGRAM:  Prior echo in office - normal LV systolic function    RADIOLOGY & ADDITIONAL STUDIES:  CT Head  < from: CT Head No Cont (08.25.21 @ 23:58) >  Technique: CT head without contrast. CT of the head was performed without contrast injection. Coronal and sagittal reformatted images were submitted for anatomic correlation.    COMPARISON CT: 4/10/2021    FINDINGS:  Ventricles and sulci are compatible with parenchymal volume loss.    There is no acute intracranial hemorrhage, extra-axial fluid collections or midline shift.    There is no depressed calvarial fracture. The mastoid air cells are aerated.  The paranasal sinuses are aerated.    Beam hardening artifact is noted overlying the brain stem and posterior fossa which is inherent to CT in this location.    IMPRESSION:    No acute intracranial hemorrhage, mass-effect or midline shift.    In view of the history of seizure, MRI of the brain is recommended for further evaluation if clinically warranted and not contra-indicated in this patient.    < end of copied text >      < from: Xray Chest 1 View-PORTABLE IMMEDIATE (Xray Chest 1 View-PORTABLE IMMEDIATE .) (08.26.21 @ 01:47) >  FINDINGS:      No consolidation, pulmonary edema, pleural effusion, or pneumothorax.  The cardiomediastinal silhouette appears enlarged, however cannot reliably be evaluated on this projection. There are aortic calcifications.  Degenerative changes of the spine.    IMPRESSION:      No focal consolidation, pleural effusion, or pneumothorax.    < end of copied text >      LABS:                        12.4   4.21  )-----------( 129      ( 26 Aug 2021 04:30 )             35.1     08-26    130<L>  |  93<L>  |  16  ----------------------------<  100<H>  4.2   |  23  |  1.1    Ca    8.7      26 Aug 2021 04:30  Mg     1.4     08-26    TPro  6.4  /  Alb  4.2  /  TBili  0.9  /  DBili  x   /  AST  34  /  ALT  19  /  AlkPhos  51  08-26    CARDIAC MARKERS ( 25 Aug 2021 22:51 )  x     / <0.01 ng/mL / x     / x     / x          PT/INR - ( 25 Aug 2021 22:51 )   PT: 11.70 sec;   INR: 1.02 ratio         PTT - ( 25 Aug 2021 22:51 )  PTT:30.1 sec  I&O's Summary

## 2021-08-26 NOTE — ED PROVIDER NOTE - OBJECTIVE STATEMENT
66Y M with PMH of HLD, B/L carotid stenosis, HTN presents with CC of syncope. Patient was at home today, went to go to the bathroom, prior to sitting, patient felt diaphoretic, lightheadedness, dizziness, had a syncopal episode lasting for a couple minutes, Wife found patient in the corner, having incontinence, no seizure like activity at the time but did noticed eyes rolled back. Patient has been having loose stools for 2-3 days and has been diagnosed with diverticulitis, not currently on ABX. Denies fevers, chills, chest pain, SOB, abdominal pain, N/V/D. Patient after arrival to the ED had another syncopal episode, but this time there was associated twitching of the right upper and left lower extremities.

## 2021-08-26 NOTE — H&P ADULT - HISTORY OF PRESENT ILLNESS
66 Y M with PMH of HLD, B/L carotid stenosis, HTN presents with CC of syncope. Patient was at home today, went to go to the bathroom, prior to sitting, patient felt diaphoretic, lightheadedness, dizziness, had a syncopal episode lasting for a couple minutes, Wife found patient in the corner, having incontinence, no seizure like activity at the time but did noticed eyes rolled back. Patient has been having loose stools for 2-3 days and has been diagnosed with diverticulitis, not currently on ABX. Denies fevers, chills, chest pain, SOB, abdominal pain, N/V/D. Patient after arrival to the ED had another syncopal episode, but this time there was associated twitching of the right upper and left lower extremities. Patient became diaphoretic, per documentation, HR did drop to 50, sinus, went up after less than a minute to the 60s.  66 Y M with PMH of HLD, B/L carotid stenosis, HTN, hypothyroidism presents with CC of syncope. Patient was at home today, went to go to the bathroom, prior to sitting, patient felt diaphoretic, lightheadedness, dizziness, had a syncopal episode lasting for a couple minutes. Wife found patient in the corner, having incontinence, no seizure like activity at the time but did noticed eyes rolled back. Patient has been having loose stools for 2-3 days and has been diagnosed with diverticulosis, not currently on ABX. Pt also stated he has had increased level of stress in the last 2 days. Denies fevers, chills, chest pain, SOB, abdominal pain, N/V/D. Patient after arrival to the ED had another syncopal episode, but this time there was associated twitching of the right upper and left lower extremities. Patient became diaphoretic, per chart, HR did drop to 50, sinus, went up after less than a minute to the 60s.     VS wnl in the ED. Labs significant for Na of 130, K of 3.4 and Mag of 1.1. CTH showed No acute intracranial hemorrhage, mass-effect or midline shift.

## 2021-08-27 LAB
ALBUMIN SERPL ELPH-MCNC: 4.1 G/DL — SIGNIFICANT CHANGE UP (ref 3.5–5.2)
ALP SERPL-CCNC: 53 U/L — SIGNIFICANT CHANGE UP (ref 30–115)
ALT FLD-CCNC: 18 U/L — SIGNIFICANT CHANGE UP (ref 0–41)
ANION GAP SERPL CALC-SCNC: 12 MMOL/L — SIGNIFICANT CHANGE UP (ref 7–14)
AST SERPL-CCNC: 36 U/L — SIGNIFICANT CHANGE UP (ref 0–41)
BILIRUB SERPL-MCNC: 0.8 MG/DL — SIGNIFICANT CHANGE UP (ref 0.2–1.2)
BUN SERPL-MCNC: 14 MG/DL — SIGNIFICANT CHANGE UP (ref 10–20)
C DIFF BY PCR RESULT: NEGATIVE — SIGNIFICANT CHANGE UP
C DIFF TOX GENS STL QL NAA+PROBE: SIGNIFICANT CHANGE UP
CALCIUM SERPL-MCNC: 8.7 MG/DL — SIGNIFICANT CHANGE UP (ref 8.5–10.1)
CHLORIDE SERPL-SCNC: 94 MMOL/L — LOW (ref 98–110)
CO2 SERPL-SCNC: 24 MMOL/L — SIGNIFICANT CHANGE UP (ref 17–32)
COVID-19 SPIKE DOMAIN AB INTERP: POSITIVE
COVID-19 SPIKE DOMAIN ANTIBODY RESULT: 110 U/ML — HIGH
CREAT SERPL-MCNC: 1.2 MG/DL — SIGNIFICANT CHANGE UP (ref 0.7–1.5)
CULTURE RESULTS: SIGNIFICANT CHANGE UP
GLUCOSE BLDC GLUCOMTR-MCNC: 174 MG/DL — HIGH (ref 70–99)
GLUCOSE SERPL-MCNC: 118 MG/DL — HIGH (ref 70–99)
HCT VFR BLD CALC: 36.8 % — LOW (ref 42–52)
HGB BLD-MCNC: 12.8 G/DL — LOW (ref 14–18)
MAGNESIUM SERPL-MCNC: 1.7 MG/DL — LOW (ref 1.8–2.4)
MCHC RBC-ENTMCNC: 30.3 PG — SIGNIFICANT CHANGE UP (ref 27–31)
MCHC RBC-ENTMCNC: 34.8 G/DL — SIGNIFICANT CHANGE UP (ref 32–37)
MCV RBC AUTO: 87 FL — SIGNIFICANT CHANGE UP (ref 80–94)
NRBC # BLD: 0 /100 WBCS — SIGNIFICANT CHANGE UP (ref 0–0)
PLATELET # BLD AUTO: 122 K/UL — LOW (ref 130–400)
POTASSIUM SERPL-MCNC: 3.4 MMOL/L — LOW (ref 3.5–5)
POTASSIUM SERPL-SCNC: 3.4 MMOL/L — LOW (ref 3.5–5)
PROT SERPL-MCNC: 6.4 G/DL — SIGNIFICANT CHANGE UP (ref 6–8)
RBC # BLD: 4.23 M/UL — LOW (ref 4.7–6.1)
RBC # FLD: 12.2 % — SIGNIFICANT CHANGE UP (ref 11.5–14.5)
SARS-COV-2 IGG+IGM SERPL QL IA: 110 U/ML — HIGH
SARS-COV-2 IGG+IGM SERPL QL IA: POSITIVE
SODIUM SERPL-SCNC: 130 MMOL/L — LOW (ref 135–146)
SPECIMEN SOURCE: SIGNIFICANT CHANGE UP
WBC # BLD: 3.86 K/UL — LOW (ref 4.8–10.8)
WBC # FLD AUTO: 3.86 K/UL — LOW (ref 4.8–10.8)

## 2021-08-27 PROCEDURE — 93306 TTE W/DOPPLER COMPLETE: CPT | Mod: 26

## 2021-08-27 RX ORDER — POTASSIUM CHLORIDE 20 MEQ
40 PACKET (EA) ORAL EVERY 4 HOURS
Refills: 0 | Status: DISCONTINUED | OUTPATIENT
Start: 2021-08-27 | End: 2021-08-27

## 2021-08-27 RX ORDER — MAGNESIUM SULFATE 500 MG/ML
2 VIAL (ML) INJECTION ONCE
Refills: 0 | Status: COMPLETED | OUTPATIENT
Start: 2021-08-27 | End: 2021-08-27

## 2021-08-27 RX ORDER — SODIUM CHLORIDE 9 MG/ML
1000 INJECTION INTRAMUSCULAR; INTRAVENOUS; SUBCUTANEOUS ONCE
Refills: 0 | Status: DISCONTINUED | OUTPATIENT
Start: 2021-08-27 | End: 2021-08-27

## 2021-08-27 RX ORDER — LOPERAMIDE HCL 2 MG
2 TABLET ORAL DAILY
Refills: 0 | Status: DISCONTINUED | OUTPATIENT
Start: 2021-08-27 | End: 2021-08-29

## 2021-08-27 RX ORDER — PANTOPRAZOLE SODIUM 20 MG/1
40 TABLET, DELAYED RELEASE ORAL
Refills: 0 | Status: DISCONTINUED | OUTPATIENT
Start: 2021-08-27 | End: 2021-08-28

## 2021-08-27 RX ORDER — SODIUM CHLORIDE 9 MG/ML
1000 INJECTION INTRAMUSCULAR; INTRAVENOUS; SUBCUTANEOUS
Refills: 0 | Status: DISCONTINUED | OUTPATIENT
Start: 2021-08-27 | End: 2021-08-29

## 2021-08-27 RX ORDER — LOPERAMIDE HCL 2 MG
2 TABLET ORAL ONCE
Refills: 0 | Status: COMPLETED | OUTPATIENT
Start: 2021-08-27 | End: 2021-08-27

## 2021-08-27 RX ORDER — POTASSIUM CHLORIDE 20 MEQ
40 PACKET (EA) ORAL EVERY 4 HOURS
Refills: 0 | Status: COMPLETED | OUTPATIENT
Start: 2021-08-27 | End: 2021-08-27

## 2021-08-27 RX ADMIN — Medication 1 DROP(S): at 11:36

## 2021-08-27 RX ADMIN — LATANOPROST 1 DROP(S): 0.05 SOLUTION/ DROPS OPHTHALMIC; TOPICAL at 22:41

## 2021-08-27 RX ADMIN — PANTOPRAZOLE SODIUM 40 MILLIGRAM(S): 20 TABLET, DELAYED RELEASE ORAL at 11:35

## 2021-08-27 RX ADMIN — Medication 175 MICROGRAM(S): at 05:52

## 2021-08-27 RX ADMIN — Medication 40 MILLIEQUIVALENT(S): at 15:11

## 2021-08-27 RX ADMIN — SODIUM CHLORIDE 75 MILLILITER(S): 9 INJECTION INTRAMUSCULAR; INTRAVENOUS; SUBCUTANEOUS at 15:25

## 2021-08-27 RX ADMIN — Medication 2 MILLIGRAM(S): at 22:41

## 2021-08-27 RX ADMIN — Medication 81 MILLIGRAM(S): at 11:35

## 2021-08-27 RX ADMIN — Medication 2 MILLIGRAM(S): at 15:08

## 2021-08-27 RX ADMIN — ATORVASTATIN CALCIUM 40 MILLIGRAM(S): 80 TABLET, FILM COATED ORAL at 11:35

## 2021-08-27 RX ADMIN — ENOXAPARIN SODIUM 40 MILLIGRAM(S): 100 INJECTION SUBCUTANEOUS at 11:36

## 2021-08-27 RX ADMIN — Medication 1 MILLIGRAM(S): at 11:35

## 2021-08-27 RX ADMIN — Medication 40 MILLIEQUIVALENT(S): at 10:36

## 2021-08-27 RX ADMIN — Medication 12.5 MILLIGRAM(S): at 05:52

## 2021-08-27 RX ADMIN — Medication 25 GRAM(S): at 10:36

## 2021-08-27 NOTE — PROGRESS NOTE ADULT - SUBJECTIVE AND OBJECTIVE BOX
MELBA MOON 66y Male  MRN#: [id number]   CODE STATUS: Full    Hospital Day: 1d    Pt is currently admitted with the primary diagnosis of Fall    SUBJECTIVE  Hospital Course  Pt seen and examined at bedside. No CP or SOB, had 4 loose bowel movements yesterday. Today pending EEG as per neuro, and Echo as per cardio for a syncope workup. Continuing to monitor and replete electrolytes as needed.    Overnight events   None    Subjective complaints   None    Present Today:   - Wolff:  No [x], Yes [   ] : Indication:     - Type of IV Access:       .. CVC/Piccline:  No [  ], Yes [   ] : Indication:       .. Midline: No [  ], Yes [   ] : Indication:                                             ----------------------------------------------------------  OBJECTIVE  PAST MEDICAL & SURGICAL HISTORY  Hypertension    High cholesterol    Hypothyroidism    GERD (gastroesophageal reflux disease)    H/O vasectomy                                              -----------------------------------------------------------  ALLERGIES:  penicillin (Hives)                                            ------------------------------------------------------------    HOME MEDICATIONS  Home Medications:  Aspir 81 oral delayed release tablet: 1 tab(s) orally once a day (26 Aug 2021 03:20)  Benicar HCT 40 mg-12.5 mg oral tablet: 1 tab(s) orally once a day (2020 04:56)  folic acid 1 mg oral tablet: 1 tab(s) orally once a day (26 Aug 2021 03:20)  Lipitor 40 mg oral tablet: 1 tab(s) orally once a day (2020 04:56)  Lovaza: 4000 milligram(s) orally once a day (26 Aug 2021 03:20)  Lumigan 0.01% ophthalmic solution: 1 drop(s) to each affected eye once a day (in the evening) (2020 04:56)  Synthroid 175 mcg (0.175 mg) oral tablet: 1 tab(s) orally once a day (2020 04:56)  timolol hemihydrate 0.5% ophthalmic solution: 1 drop(s) to each affected eye 2 times a day (2020 04:56)  Vitamin D2 50 mcg (2000 intl units) oral capsule: 1 cap(s) orally once a day (26 Aug 2021 03:21)                           MEDICATIONS:  STANDING MEDICATIONS  aspirin enteric coated 81 milliGRAM(s) Oral daily  atorvastatin Oral Tab/Cap - Peds 40 milliGRAM(s) Oral daily  enoxaparin Injectable 40 milliGRAM(s) SubCutaneous daily  folic acid 1 milliGRAM(s) Oral daily  hydrochlorothiazide 12.5 milliGRAM(s) Oral daily  latanoprost 0.005% Ophthalmic Solution 1 Drop(s) Both EYES at bedtime  levothyroxine 175 MICROGram(s) Oral daily  olmesartan 40 mg/hydrochlorothiazide 12.5 mg 1 Tablet(s) Oral daily  omega-3-Acid Ethyl Esters 4 Gram(s) Oral daily  timolol 0.25% Solution 1 Drop(s) Both EYES daily    PRN MEDICATIONS  acetaminophen   Tablet .. 650 milliGRAM(s) Oral every 6 hours PRN                                            ------------------------------------------------------------  VITAL SIGNS: Last 24 Hours  T(C): 37.1 (27 Aug 2021 06:08), Max: 38.2 (26 Aug 2021 12:39)  T(F): 98.7 (27 Aug 2021 06:08), Max: 100.8 (26 Aug 2021 12:39)  HR: 74 (27 Aug 2021 06:08) (67 - 95)  BP: 156/72 (26 Aug 2021 22:24) (143/84 - 157/78)  BP(mean): --  RR: 18 (27 Aug 2021 06:08) (18 - 18)  SpO2: 97% (26 Aug 2021 23:20) (97% - 97%)      21 @ 07:01  -  21 @ 07:00  --------------------------------------------------------  IN: 120 mL / OUT: 300 mL / NET: -180 mL    21 @ 07:01  -  21 @ 09:40  --------------------------------------------------------  IN: 250 mL / OUT: 0 mL / NET: 250 mL                                             --------------------------------------------------------------  LABS:                        12.8   3.86  )-----------( 122      ( 27 Aug 2021 06:47 )             36.8     0827    130<L>  |  94<L>  |  14  ----------------------------<  118<H>  3.4<L>   |  24  |  1.2    Ca    8.7      27 Aug 2021 06:47  Mg     1.7         TPro  6.4  /  Alb  4.1  /  TBili  0.8  /  DBili  x   /  AST  36  /  ALT  18  /  AlkPhos  53      PT/INR - ( 25 Aug 2021 22:51 )   PT: 11.70 sec;   INR: 1.02 ratio         PTT - ( 25 Aug 2021 22:51 )  PTT:30.1 sec  Urinalysis Basic - ( 26 Aug 2021 15:30 )    Color: Light Yellow / Appearance: Clear / S.016 / pH: x  Gluc: x / Ketone: Trace  / Bili: Negative / Urobili: <2 mg/dL   Blood: x / Protein: Trace / Nitrite: Negative   Leuk Esterase: Negative / RBC: x / WBC x   Sq Epi: x / Non Sq Epi: x / Bacteria: x            CARDIAC MARKERS ( 25 Aug 2021 22:51 )  x     / <0.01 ng/mL / x     / x     / x                                                  -------------------------------------------------------------  RADIOLOGY:    Carotid Duplex :  IMPRESSION: Bilateral internal carotid arteries show 20-39% (mild) stenosis.    Measurement of carotid stenosis is based on velocity parameters that correlate the residual internal carotid diameter with that of the more distal vessel in accordance with a method such asthe North American Symptomatic Carotid Endarterectomy Trial (NASCET).      --- End of Report ---                                              --------------------------------------------------------------    PHYSICAL EXAM:  General:   HEENT:  LUNGS:  HEART:  ABDOMEN:  EXT:  NEURO:  SKIN:                                           --------------------------------------------------------------    ASSESSMENT & PLAN    Past medical history and hospital course                                                                                                           ----------------------------------------------------  # DVT prophylaxis     # GI prophylaxis     # Diet     # Activity Score (AM-PAC)    # Code status     # Disposition                                                                              --------------------------------------------------------    # Handoff      MELBA MOON 66y Male  MRN#: [id number]   CODE STATUS: Full    Hospital Day: 1d    Pt is currently admitted with the primary diagnosis of Fall    SUBJECTIVE  Hospital Course  Pt seen and examined at bedside. No CP or SOB, had 4 loose bowel movements yesterday. Today pending EEG as per neuro, and Echo as per cardio for a syncope workup. Continuing to monitor and replete electrolytes as needed.    Overnight events   None    Subjective complaints   None    Present Today:   - Wolff:  No [x], Yes [   ] : Indication:     - Type of IV Access:       .. CVC/Piccline:  No [  ], Yes [   ] : Indication:       .. Midline: No [  ], Yes [   ] : Indication:                                             ----------------------------------------------------------  OBJECTIVE  PAST MEDICAL & SURGICAL HISTORY  Hypertension    High cholesterol    Hypothyroidism    GERD (gastroesophageal reflux disease)    H/O vasectomy                                              -----------------------------------------------------------  ALLERGIES:  penicillin (Hives)                                            ------------------------------------------------------------    HOME MEDICATIONS  Home Medications:  Aspir 81 oral delayed release tablet: 1 tab(s) orally once a day (26 Aug 2021 03:20)  Benicar HCT 40 mg-12.5 mg oral tablet: 1 tab(s) orally once a day (2020 04:56)  folic acid 1 mg oral tablet: 1 tab(s) orally once a day (26 Aug 2021 03:20)  Lipitor 40 mg oral tablet: 1 tab(s) orally once a day (2020 04:56)  Lovaza: 4000 milligram(s) orally once a day (26 Aug 2021 03:20)  Lumigan 0.01% ophthalmic solution: 1 drop(s) to each affected eye once a day (in the evening) (2020 04:56)  Synthroid 175 mcg (0.175 mg) oral tablet: 1 tab(s) orally once a day (2020 04:56)  timolol hemihydrate 0.5% ophthalmic solution: 1 drop(s) to each affected eye 2 times a day (2020 04:56)  Vitamin D2 50 mcg (2000 intl units) oral capsule: 1 cap(s) orally once a day (26 Aug 2021 03:21)                           MEDICATIONS:  STANDING MEDICATIONS  aspirin enteric coated 81 milliGRAM(s) Oral daily  atorvastatin Oral Tab/Cap - Peds 40 milliGRAM(s) Oral daily  enoxaparin Injectable 40 milliGRAM(s) SubCutaneous daily  folic acid 1 milliGRAM(s) Oral daily  hydrochlorothiazide 12.5 milliGRAM(s) Oral daily  latanoprost 0.005% Ophthalmic Solution 1 Drop(s) Both EYES at bedtime  levothyroxine 175 MICROGram(s) Oral daily  olmesartan 40 mg/hydrochlorothiazide 12.5 mg 1 Tablet(s) Oral daily  omega-3-Acid Ethyl Esters 4 Gram(s) Oral daily  timolol 0.25% Solution 1 Drop(s) Both EYES daily    PRN MEDICATIONS  acetaminophen   Tablet .. 650 milliGRAM(s) Oral every 6 hours PRN                                            ------------------------------------------------------------  VITAL SIGNS: Last 24 Hours  T(C): 37.1 (27 Aug 2021 06:08), Max: 38.2 (26 Aug 2021 12:39)  T(F): 98.7 (27 Aug 2021 06:08), Max: 100.8 (26 Aug 2021 12:39)  HR: 74 (27 Aug 2021 06:08) (67 - 95)  BP: 156/72 (26 Aug 2021 22:24) (143/84 - 157/78)  BP(mean): --  RR: 18 (27 Aug 2021 06:08) (18 - 18)  SpO2: 97% (26 Aug 2021 23:20) (97% - 97%)      21 @ 07:01  -  21 @ 07:00  --------------------------------------------------------  IN: 120 mL / OUT: 300 mL / NET: -180 mL    21 @ 07:01  -  21 @ 09:40  --------------------------------------------------------  IN: 250 mL / OUT: 0 mL / NET: 250 mL                                             --------------------------------------------------------------  LABS:                        12.8   3.86  )-----------( 122      ( 27 Aug 2021 06:47 )             36.8     0827    130<L>  |  94<L>  |  14  ----------------------------<  118<H>  3.4<L>   |  24  |  1.2    Ca    8.7      27 Aug 2021 06:47  Mg     1.7         TPro  6.4  /  Alb  4.1  /  TBili  0.8  /  DBili  x   /  AST  36  /  ALT  18  /  AlkPhos  53      PT/INR - ( 25 Aug 2021 22:51 )   PT: 11.70 sec;   INR: 1.02 ratio         PTT - ( 25 Aug 2021 22:51 )  PTT:30.1 sec  Urinalysis Basic - ( 26 Aug 2021 15:30 )    Color: Light Yellow / Appearance: Clear / S.016 / pH: x  Gluc: x / Ketone: Trace  / Bili: Negative / Urobili: <2 mg/dL   Blood: x / Protein: Trace / Nitrite: Negative   Leuk Esterase: Negative / RBC: x / WBC x   Sq Epi: x / Non Sq Epi: x / Bacteria: x            CARDIAC MARKERS ( 25 Aug 2021 22:51 )  x     / <0.01 ng/mL / x     / x     / x                                                  -------------------------------------------------------------  RADIOLOGY:    Echo   Summary:   1. Normal global left ventricular systolic function.   2. LV Ejection Fraction by Smith's Method with a biplane EF of 66 %.   3. Normal left ventricular internal cavity size.   4. Spectral Doppler shows impaired relaxation pattern of left ventricular myocardial filling (Grade I diastolic dysfunction).   5. Normal left atrial size.   6. Normal right atrial size.   7. There is no evidence of pericardial effusion.   8. Mild mitral annular calcification.   9. Mild mitral valve regurgitation.  10. Mild thickening of the anterior and posterior mitral valve leaflets.      Carotid Duplex :  IMPRESSION: Bilateral internal carotid arteries show 20-39% (mild) stenosis.    Measurement of carotid stenosis is based on velocity parameters that correlate the residual internal carotid diameter with that of the more distal vessel in accordance with a method such asthe North American Symptomatic Carotid Endarterectomy Trial (NASCET).      --- End of Report ---      EXAM:  XR CHEST PORTABLE IMMED 1V          PROCEDURE DATE:  2021      IMPRESSION:    No focal consolidation, pleural effusion, or pneumothorax.    --- End of Report ---        EXAM:  CT BRAIN          PROCEDURE DATE:  2021      IMPRESSION:    No acute intracranial hemorrhage, mass-effect or midline shift.    In view of the history of seizure, MRI of the brain is recommended for further evaluation if clinically warranted and not contra-indicated in this patient.    --- End of Report ---                                            --------------------------------------------------------------    PHYSICAL EXAM:  General: NAD, AOx3  HEENT: Normocephalic, atraumatic  LUNGS: Normal breath sounds, no wheezes/crackles  HEART: RRR, no murmurs, rubs, or gallops  ABDOMEN: Soft, NT/ND. No rigidity/guarding. +BS  EXT: Peripheral pulses +2, no cyanosis/edema  NEURO: Grossly normal motor exam.   SKIN: No rashes or bruises                                           --------------------------------------------------------------

## 2021-08-27 NOTE — PROGRESS NOTE ADULT - SUBJECTIVE AND OBJECTIVE BOX
Vitals:  T(C): 36.7 (08-27-21 @ 12:50), Max: 38.2 (08-26-21 @ 12:39)  HR: 74 (08-27-21 @ 11:42) (67 - 95)  BP: 120/76 (08-27-21 @ 11:42) (120/76 - 161/83)  RR: 18 (08-27-21 @ 12:50) (18 - 18)  SpO2: 98% (08-27-21 @ 19:25) (95% - 98%)  ECG:    LABS:                        12.8   3.86  )-----------( 122      ( 27 Aug 2021 06:47 )             36.8     08-27    130<L>  |  94<L>  |  14  ----------------------------<  118<H>  3.4<L>   |  24  |  1.2    Ca    8.7      27 Aug 2021 06:47  Mg     1.7     08-27    TPro  6.4  /  Alb  4.1  /  TBili  0.8  /  DBili  x   /  AST  36  /  ALT  18  /  AlkPhos  53  08-27    PT/INR - ( 25 Aug 2021 22:51 )   PT: 11.70 sec;   INR: 1.02 ratio         PTT - ( 25 Aug 2021 22:51 )  PTT:30.1 sec  MEDICATIONS  (STANDING):  aspirin enteric coated 81 milliGRAM(s) Oral daily  atorvastatin Oral Tab/Cap - Peds 40 milliGRAM(s) Oral daily  enoxaparin Injectable 40 milliGRAM(s) SubCutaneous daily  folic acid 1 milliGRAM(s) Oral daily  hydrochlorothiazide 12.5 milliGRAM(s) Oral daily  latanoprost 0.005% Ophthalmic Solution 1 Drop(s) Both EYES at bedtime  levothyroxine 175 MICROGram(s) Oral daily  loperamide 2 milliGRAM(s) Oral daily  omega-3-Acid Ethyl Esters 4 Gram(s) Oral daily  pantoprazole    Tablet 40 milliGRAM(s) Oral before breakfast  sodium chloride 0.9%. 1000 milliLiter(s) (75 mL/Hr) IV Continuous <Continuous>  timolol 0.25% Solution 1 Drop(s) Both EYES daily    MEDICATIONS  (PRN):  acetaminophen   Tablet .. 650 milliGRAM(s) Oral every 6 hours PRN Temp greater or equal to 38.5C (101.3F)      PHYSICAL EXAM:    Constitutional: appears stated age, well developed/nourished, no acute distress  Eyes: EOM's intact.  PERRLA  ENMT: Normocephalic, atraumatic.  Clear oropharynx.  No ear discharge.  Neck: Jugular veins non-distended; no carotid bruits bilaterally.  Respiratory: respiratory pattern unlabored; no dullness to percussion; lungs clear to auscultation bilaterally.  Cardiovascular: Regular rhythm.  S1 and S2 normal.  No murmur nor rub appreciated.  Abdomen: Soft, non-tender.  Normal bowel sounds.  Extremities: extremities warm; no cyanosis, clubbing or edema.  Pulses: Intact bilaterally  Skin: No gross abnormalities noted.  Musculoskeletal: No gross deformities  Neurological: Alert, oriented x 3.  No focal neurologic deficits noted.           Patient was seen and examined this evening by me on 3C.  Events during the day noted    He is comfortable in bed.  He had 3 episodes of diarrhea today.  He had his echo done this morning.  While he was waiting for the transporter to take him back to 3[facility], he felt the urge to move his bowel.   He became diaphoretic while to hold his bowel movement and felt like passing out.  He subsequent soiled himself due to diarrhea.  While he was in his bed, he had another similar episode of having a "queasy stomach" and felt diaphoretic.  Telemetry disclosure shows no event.    Vitals:  T(C): 36.7 (08-27-21 @ 12:50), Max: 38.2 (08-26-21 @ 12:39)  HR: 74 (08-27-21 @ 11:42) (67 - 95)  BP: 120/76 (08-27-21 @ 11:42) (120/76 - 161/83)  RR: 18 (08-27-21 @ 12:50) (18 - 18)  SpO2: 98% (08-27-21 @ 19:25) (95% - 98%)  ECG:    LABS:                        12.8   3.86  )-----------( 122      ( 27 Aug 2021 06:47 )             36.8     08-27    130<L>  |  94<L>  |  14  ----------------------------<  118<H>  3.4<L>   |  24  |  1.2    Ca    8.7      27 Aug 2021 06:47  Mg     1.7     08-27    TPro  6.4  /  Alb  4.1  /  TBili  0.8  /  DBili  x   /  AST  36  /  ALT  18  /  AlkPhos  53  08-27    PT/INR - ( 25 Aug 2021 22:51 )   PT: 11.70 sec;   INR: 1.02 ratio         PTT - ( 25 Aug 2021 22:51 )  PTT:30.1 sec  MEDICATIONS  (STANDING):  aspirin enteric coated 81 milliGRAM(s) Oral daily  atorvastatin Oral Tab/Cap - Peds 40 milliGRAM(s) Oral daily  enoxaparin Injectable 40 milliGRAM(s) SubCutaneous daily  folic acid 1 milliGRAM(s) Oral daily  hydrochlorothiazide 12.5 milliGRAM(s) Oral daily  latanoprost 0.005% Ophthalmic Solution 1 Drop(s) Both EYES at bedtime  levothyroxine 175 MICROGram(s) Oral daily  loperamide 2 milliGRAM(s) Oral daily  omega-3-Acid Ethyl Esters 4 Gram(s) Oral daily  pantoprazole    Tablet 40 milliGRAM(s) Oral before breakfast  sodium chloride 0.9%. 1000 milliLiter(s) (75 mL/Hr) IV Continuous <Continuous>  timolol 0.25% Solution 1 Drop(s) Both EYES daily    MEDICATIONS  (PRN):  acetaminophen   Tablet .. 650 milliGRAM(s) Oral every 6 hours PRN Temp greater or equal to 38.5C (101.3F)      PHYSICAL EXAM:  Constitutional: appears stated age, well developed/nourished, no acute distress  Eyes: EOM's intact.  PERRLA  ENMT: Normocephalic, atraumatic.    Neck: Jugular veins non-distended; no carotid bruits bilaterally.  Respiratory:  lungs clear to auscultation bilaterally.  Cardiovascular: Regular rhythm.  S1 and S2 normal.  No murmur nor rub appreciated.  Abdomen: Soft, non-tender.  Normal bowel sounds.  Extremities: extremities warm; no edema.  Pulses: Intact bilaterally  Skin: No gross abnormalities noted.  Musculoskeletal: No gross deformities  Neurological: Alert, oriented x 3.  No focal neurologic deficits noted.

## 2021-08-27 NOTE — PROVIDER CONTACT NOTE (OTHER) - SITUATION
this Am patient had episode of lightheadedness in echo, when returning to floor vitals remained stable, patient stated relief. at around 1050 AM patient experienced the same feeling.

## 2021-08-27 NOTE — PROGRESS NOTE ADULT - SUBJECTIVE AND OBJECTIVE BOX
Patient was seen and examined. Spoke with RN. Chart reviewed.  No events overnight. Diarrhea improving; feels much better  Vital Signs Last 24 Hrs  T(F): 98.7 (27 Aug 2021 06:08), Max: 100.8 (26 Aug 2021 12:39)  HR: 74 (27 Aug 2021 06:08) (67 - 95)  BP: 156/72 (26 Aug 2021 22:24) (143/84 - 161/83)  SpO2: 97% (26 Aug 2021 23:20) (97% - 98%)  MEDICATIONS  (STANDING):  aspirin enteric coated 81 milliGRAM(s) Oral daily  atorvastatin Oral Tab/Cap - Peds 40 milliGRAM(s) Oral daily  enoxaparin Injectable 40 milliGRAM(s) SubCutaneous daily  folic acid 1 milliGRAM(s) Oral daily  hydrochlorothiazide 12.5 milliGRAM(s) Oral daily  latanoprost 0.005% Ophthalmic Solution 1 Drop(s) Both EYES at bedtime  levothyroxine 175 MICROGram(s) Oral daily  olmesartan 40 mg/hydrochlorothiazide 12.5 mg 1 Tablet(s) Oral daily  omega-3-Acid Ethyl Esters 4 Gram(s) Oral daily  timolol 0.25% Solution 1 Drop(s) Both EYES daily    MEDICATIONS  (PRN):  acetaminophen   Tablet .. 650 milliGRAM(s) Oral every 6 hours PRN Temp greater or equal to 38.5C (101.3F)    Labs:                        12.8   3.86  )-----------( 122      ( 27 Aug 2021 06:47 )             36.8                         12.4   4.21  )-----------( 129      ( 26 Aug 2021 04:30 )             35.1     27 Aug 2021 06:47    130    |  94     |  14     ----------------------------<  118    3.4     |  24     |  1.2    26 Aug 2021 04:30    130    |  93     |  16     ----------------------------<  100    4.2     |  23     |  1.1      Ca    8.7        27 Aug 2021 06:47  Ca    8.7        26 Aug 2021 04:30  Mg     1.7       27 Aug 2021 06:47  Mg     1.4       26 Aug 2021 04:30    TPro  6.4    /  Alb  4.1    /  TBili  0.8    /  DBili  x      /  AST  36     /  ALT  18     /  AlkPhos  53     27 Aug 2021 06:47  TPro  6.4    /  Alb  4.2    /  TBili  0.9    /  DBili  x      /  AST  34     /  ALT  19     /  AlkPhos  51     26 Aug 2021 04:30    PT/INR - ( 25 Aug 2021 22:51 )   PT: 11.70 sec;   INR: 1.02 ratio         PTT - ( 25 Aug 2021 22:51 )  PTT:30.1 sec  Urinalysis Basic - ( 26 Aug 2021 15:30 )    Color: Light Yellow / Appearance: Clear / S.016 / pH: x  Gluc: x / Ketone: Trace  / Bili: Negative / Urobili: <2 mg/dL   Blood: x / Protein: Trace / Nitrite: Negative   Leuk Esterase: Negative / RBC: x / WBC x   Sq Epi: x / Non Sq Epi: x / Bacteria: x        General: comfortable, NAD  Neurology: A&Ox3, nonfocal    carotid duplex results on chart    A/P:  65 YO man with HLD, B/L carotid stenosis, HTN, hypothyroidism admitted with recurrent syncope       full neuro workup as per neurology- EEG today    carotid duplex unremarkable    echo as per cardio    fall/seizure precautions    monitor diarrhea; encourage PO hydration     OOB to chair    tele    cardiology f/u to decide if needs loop recorder    DC home when cleared by neuro and cardio    DVT prophylaxis  Decubitus prevention- all measures as per RN protocol  Please call or text me with any questions or updates

## 2021-08-27 NOTE — PROVIDER CONTACT NOTE (OTHER) - ACTION/TREATMENT ORDERED:
patient placed in Trendelenburg, repeat BP 91/50. fluids started as ordered. fs 171. currently at 1107 /59, patient with stated relief. Bp meds with be held as ordered per MD

## 2021-08-28 LAB
ALBUMIN SERPL ELPH-MCNC: 3.8 G/DL — SIGNIFICANT CHANGE UP (ref 3.5–5.2)
ALP SERPL-CCNC: 41 U/L — SIGNIFICANT CHANGE UP (ref 30–115)
ALT FLD-CCNC: 16 U/L — SIGNIFICANT CHANGE UP (ref 0–41)
ANION GAP SERPL CALC-SCNC: 11 MMOL/L — SIGNIFICANT CHANGE UP (ref 7–14)
AST SERPL-CCNC: 30 U/L — SIGNIFICANT CHANGE UP (ref 0–41)
BASOPHILS # BLD AUTO: 0.03 K/UL — SIGNIFICANT CHANGE UP (ref 0–0.2)
BASOPHILS NFR BLD AUTO: 0.7 % — SIGNIFICANT CHANGE UP (ref 0–1)
BILIRUB SERPL-MCNC: 0.7 MG/DL — SIGNIFICANT CHANGE UP (ref 0.2–1.2)
BUN SERPL-MCNC: 9 MG/DL — LOW (ref 10–20)
CALCIUM SERPL-MCNC: 8.1 MG/DL — LOW (ref 8.5–10.1)
CHLORIDE SERPL-SCNC: 100 MMOL/L — SIGNIFICANT CHANGE UP (ref 98–110)
CO2 SERPL-SCNC: 22 MMOL/L — SIGNIFICANT CHANGE UP (ref 17–32)
CREAT SERPL-MCNC: 1 MG/DL — SIGNIFICANT CHANGE UP (ref 0.7–1.5)
EOSINOPHIL # BLD AUTO: 0.1 K/UL — SIGNIFICANT CHANGE UP (ref 0–0.7)
EOSINOPHIL NFR BLD AUTO: 2.5 % — SIGNIFICANT CHANGE UP (ref 0–8)
GLUCOSE SERPL-MCNC: 113 MG/DL — HIGH (ref 70–99)
HCT VFR BLD CALC: 33.3 % — LOW (ref 42–52)
HGB BLD-MCNC: 11.5 G/DL — LOW (ref 14–18)
IMM GRANULOCYTES NFR BLD AUTO: 0.2 % — SIGNIFICANT CHANGE UP (ref 0.1–0.3)
LYMPHOCYTES # BLD AUTO: 1.22 K/UL — SIGNIFICANT CHANGE UP (ref 1.2–3.4)
LYMPHOCYTES # BLD AUTO: 30.3 % — SIGNIFICANT CHANGE UP (ref 20.5–51.1)
MAGNESIUM SERPL-MCNC: 1.8 MG/DL — SIGNIFICANT CHANGE UP (ref 1.8–2.4)
MCHC RBC-ENTMCNC: 30.2 PG — SIGNIFICANT CHANGE UP (ref 27–31)
MCHC RBC-ENTMCNC: 34.5 G/DL — SIGNIFICANT CHANGE UP (ref 32–37)
MCV RBC AUTO: 87.4 FL — SIGNIFICANT CHANGE UP (ref 80–94)
MONOCYTES # BLD AUTO: 0.48 K/UL — SIGNIFICANT CHANGE UP (ref 0.1–0.6)
MONOCYTES NFR BLD AUTO: 11.9 % — HIGH (ref 1.7–9.3)
NEUTROPHILS # BLD AUTO: 2.19 K/UL — SIGNIFICANT CHANGE UP (ref 1.4–6.5)
NEUTROPHILS NFR BLD AUTO: 54.4 % — SIGNIFICANT CHANGE UP (ref 42.2–75.2)
NRBC # BLD: 0 /100 WBCS — SIGNIFICANT CHANGE UP (ref 0–0)
PLATELET # BLD AUTO: 130 K/UL — SIGNIFICANT CHANGE UP (ref 130–400)
POTASSIUM SERPL-MCNC: 4.1 MMOL/L — SIGNIFICANT CHANGE UP (ref 3.5–5)
POTASSIUM SERPL-SCNC: 4.1 MMOL/L — SIGNIFICANT CHANGE UP (ref 3.5–5)
PROT SERPL-MCNC: 5.6 G/DL — LOW (ref 6–8)
RBC # BLD: 3.81 M/UL — LOW (ref 4.7–6.1)
RBC # FLD: 12.3 % — SIGNIFICANT CHANGE UP (ref 11.5–14.5)
SODIUM SERPL-SCNC: 133 MMOL/L — LOW (ref 135–146)
WBC # BLD: 4.03 K/UL — LOW (ref 4.8–10.8)
WBC # FLD AUTO: 4.03 K/UL — LOW (ref 4.8–10.8)

## 2021-08-28 PROCEDURE — 99232 SBSQ HOSP IP/OBS MODERATE 35: CPT

## 2021-08-28 PROCEDURE — 95819 EEG AWAKE AND ASLEEP: CPT | Mod: 26

## 2021-08-28 RX ORDER — LOPERAMIDE HCL 2 MG
2 TABLET ORAL ONCE
Refills: 0 | Status: COMPLETED | OUTPATIENT
Start: 2021-08-28 | End: 2021-08-28

## 2021-08-28 RX ORDER — OMEPRAZOLE 10 MG/1
20 CAPSULE, DELAYED RELEASE ORAL DAILY
Refills: 0 | Status: DISCONTINUED | OUTPATIENT
Start: 2021-08-28 | End: 2021-08-29

## 2021-08-28 RX ADMIN — Medication 12.5 MILLIGRAM(S): at 05:27

## 2021-08-28 RX ADMIN — Medication 2 MILLIGRAM(S): at 11:32

## 2021-08-28 RX ADMIN — ENOXAPARIN SODIUM 40 MILLIGRAM(S): 100 INJECTION SUBCUTANEOUS at 11:33

## 2021-08-28 RX ADMIN — Medication 175 MICROGRAM(S): at 05:27

## 2021-08-28 RX ADMIN — Medication 1 DROP(S): at 11:33

## 2021-08-28 RX ADMIN — Medication 1 MILLIGRAM(S): at 11:32

## 2021-08-28 RX ADMIN — Medication 4 GRAM(S): at 11:33

## 2021-08-28 RX ADMIN — ATORVASTATIN CALCIUM 40 MILLIGRAM(S): 80 TABLET, FILM COATED ORAL at 11:32

## 2021-08-28 RX ADMIN — Medication 2 MILLIGRAM(S): at 05:44

## 2021-08-28 RX ADMIN — LATANOPROST 1 DROP(S): 0.05 SOLUTION/ DROPS OPHTHALMIC; TOPICAL at 21:23

## 2021-08-28 RX ADMIN — PANTOPRAZOLE SODIUM 40 MILLIGRAM(S): 20 TABLET, DELAYED RELEASE ORAL at 05:27

## 2021-08-28 RX ADMIN — Medication 81 MILLIGRAM(S): at 11:32

## 2021-08-28 NOTE — PROGRESS NOTE ADULT - SUBJECTIVE AND OBJECTIVE BOX
Pt feels fine. no chest pain no dyspnea. no palpitations.   no diarrhea since last night after loperamide. no dizziness.    On Exam:    Vital Signs Last 24 Hrs  T(C): 36.2 (28 Aug 2021 06:10), Max: 36.7 (27 Aug 2021 12:50)  T(F): 97.1 (28 Aug 2021 06:10), Max: 98 (27 Aug 2021 12:50)  HR: 65 (28 Aug 2021 06:10) (65 - 74)  BP: 133/78 (28 Aug 2021 06:10) (120/76 - 134/80)  BP(mean): --  RR: 18 (28 Aug 2021 06:10) (16 - 18)  SpO2: 99% (28 Aug 2021 08:00) (98% - 99%)    PHYSICAL EXAM:    · NECK:	 supple  ·RESPIRATORY:  Good air entry bilaterally.  · CARDIOVASCULAR	regular rate and rhythm    Abdomen soft non tender. bs +. no guarding or rigidity.  · EXTREMITIES: No edema.  ·NEUROLOGICAL:   alert and oriented x 3;                          11.5   4.03  )-----------( 130      ( 28 Aug 2021 06:29 )             33.3       08-28    133<L>  |  100  |  9<L>  ----------------------------<  113<H>  4.1   |  22  |  1.0    Ca    8.1<L>      28 Aug 2021 06:29  Mg     1.8     08-28    TPro  5.6<L>  /  Alb  3.8  /  TBili  0.7  /  DBili  x   /  AST  30  /  ALT  16  /  AlkPhos  41  08-28

## 2021-08-28 NOTE — PROGRESS NOTE ADULT - ASSESSMENT
66 Y M with PMH of HLD, B/L carotid stenosis, HTN, hypothyroidism presents with CC of syncope. Patient after arrival to the ED had another syncopal episode, but this time there was associated twitching of the right upper and left lower extremities. Patient became diaphoretic, per chart, HR did drop to 50, sinus, went up after less than a minute to the 60s.   VS wnl in the ED. Labs significant for Na of 130, K of 3.4 and Mag of 1.1. CTH showed No acute intracranial hemorrhage, mass-effect or midline shift.    A/P:   Recurrent syncope: likely vasovagal.   Orthostatic changes negative,   Head CT normal.   Echo showed normal LVEF, no significant valvular disease.   Telemetry showed no arrhythmia.   EEG normal.   cardiology recommended no need for loop recorder.   Benicar held,  continue IV fluid until diarrhea improves, encourage oral hydration.    Acute diarrhea:   Likely gastroenteritis.  C.diff negative. GI consulted.     Hypokalemia  Hypomagnesemia  Hyponatremia:   Hold HCTZ. Replcae.    HTN  Hold Benicar and HCTZ    Hypothyroidism: synthroid,      DVT prophylaxis lovenox    #Progress Note Handoff:  Pending (specify):  improving diarrhea, GI consult.   Family discussion:  Disposition: Home.  
1] Syncope/Near Syncope - highly probable vasovagal mechanism.  Doubt Seizure  - Telemetry disclosure so far has shown no events (no malignant tachyarrhythmias, pauses or AV blocks)  - Echo findings noted.  No hemodynamically significant stenosis of bilateral ICA's  - No need for ILR at this time.  If further telemetry needed as outpatient, will recommend 30-d MCOT  - Patient has no prior history of seizure.    - Follow up on EEG.    2] Hypertension  - Continue to monitor  - Benicar stopped.  Consider stopping HCTZ if patient continues to have diarrhea  - In place of Benicar and HCT, start Amlodipine for BP control if needed    3] Gastroenteritis - etiology unclear  - C Dif negative  - Consider GI evaluation (Dr. Imer Mo) if diarrhea persists.  Patient sees Dr. Mo as outpatient.  - Encourage po intake.  Replace fluid loss with intravenous fluids.   Keep K>4 and Mg>2    Pt states this happens to him often and he somehow attributes this to his "tinnitus problem and white noise which upsets his stomach"  says has diarrhea at least once a month.   had a few months ago, extensive work up with GI including scopies  (found diverticulosis) and CT of chest /abdomen. No etiology found apparently.   pt to follow with out pt gi.     DVT prophylaxis  Please recall us prn   pt may follow with dr. Steele after discharge.     
ASSESSMENT & PLAN    66 Y M with PMH of HLD, B/L carotid stenosis, HTN, hypothyroidism presents with CC of syncope. Patient was at home today, went to go to the bathroom, prior to sitting, patient felt diaphoretic, lightheadedness, dizziness, had a syncopal episode lasting for a couple minutes. Wife found patient in the corner, having incontinence, no seizure like activity at the time but did noticed eyes rolled back. Patient has been having loose stools for 2-3 days and has been diagnosed with diverticulosis, not currently on ABX. Pt also stated he has had increased level of stress in the last 2 days. Denies fevers, chills, chest pain, SOB, abdominal pain, N/V/D. Patient after arrival to the ED had another syncopal episode, but this time there was associated twitching of the right upper and left lower extremities. Patient became diaphoretic, per chart, HR did drop to 50, sinus, went up after less than a minute to the 60s.     VS wnl in the ED. Labs significant for Na of 130, K of 3.4 and Mag of 1.1. CTH showed No acute intracranial hemorrhage, mass-effect or midline shift.    # Recurrent syncope   - Orthostatic vitals +ve  - Patient had multiple episodes of watery diarrhea over the past few days, today 3 episodes. C diff negative  - CT Head -ve. Carotid duplex shows mild b/l stenosis  - Echo: EF 67% with G1DD  - f/u REEG   - Monitor electrolytes and replete  - c/w HCTZ 12.5 qd. Stopped Benicar as per cardio  -f/u neuro recs  -f/u cardio recs     #Hypokalemia  #Hypomagnesemia  - Na 130, K 3.4, Mg 1.4. Repleted, F/u  - Started NS 75ml/hr as patient is hyponatremic and had +ve orthostatics w/ multiple episodes of diarrhea  - F/u bmp and Mg daily and replete    # HTN  - c/w HCTZ    # DLD  - c/w statins    # Hypothyroidism  - c/w synthroid, f/u TSH    #Handoff: F/u rEEG, cardio, and neuro recs. Monitor hemodynamic status and F/u BMP and Mg and replete as needed. If patient still has diarrhea, can give imodium as C. diff is negative.                                                                              ----------------------------------------------------  # DVT prophylaxis lovenox    # GI prophylaxis Protonix    # Diet DASH/TLC    # Activity Score (AM-PAC)    # Code status Full    # Disposition Acute    
1] Syncope/Near Syncope - highly probable vasovagal mechanism.  ?Seizure  - Telemetry disclosure so far has shown no events (no malignant tachyarrhythmias, pauses or AV blocks)  - Echo findings noted.  No hemodynamically significant stenosis of bilateral ICA's  - Doubt need for ILR at this time.  If further telemetry needed as outpatient, will recommend 30-d MCOT  - Patient has no prior history of seizure.    - Follow up on EEG.    2] Hypertension  - Continue to monitor  - Benicar stopped.  Consider stopping HCTZ if patient continues to have diarrhea  - In place of Benicar and HCT, start Amlodipine for BP control if needed    3] Gastroenteritis - etiology unclear  - C Dif negative  - Consider GI evaluation (Dr. Imer Mo) if diarrhea persists.  Patient sees Dr. Mo as outpatient.  - Encourage po intake.  Replace fluid loss with intravenous fluids.   Keep K>4 and Mg>2    DVT prophylaxis  Plan discussed with [facility] [id number]    Bin Steele MD  [phone]

## 2021-08-28 NOTE — PROGRESS NOTE ADULT - SUBJECTIVE AND OBJECTIVE BOX
FATMATA MOONE  66y  Male      Patient is a 66y old  Male who presents with a chief complaint of syncope (28 Aug 2021 09:57)      INTERVAL HPI/OVERNIGHT EVENTS:  He is till with watery diarrhea, improved after Immodium.   Vital Signs Last 24 Hrs  T(C): 35.9 (28 Aug 2021 12:46), Max: 36.6 (27 Aug 2021 20:57)  T(F): 96.7 (28 Aug 2021 12:46), Max: 97.8 (27 Aug 2021 20:57)  HR: 65 (28 Aug 2021 06:10) (65 - 68)  BP: 133/78 (28 Aug 2021 06:10) (133/78 - 134/80)  BP(mean): --  RR: 18 (28 Aug 2021 12:46) (16 - 18)  SpO2: 99% (28 Aug 2021 08:00) (98% - 99%)      21 @ 07:01  -  21 @ 07:00  --------------------------------------------------------  IN: 2145 mL / OUT: 1585 mL / NET: 560 mL    21 @ 07:01  -  21 @ 15:09  --------------------------------------------------------  IN: 1444 mL / OUT: 1125 mL / NET: 319 mL            Consultant(s) Notes Reviewed:  [x ] YES  [ ] NO          MEDICATIONS  (STANDING):  aspirin enteric coated 81 milliGRAM(s) Oral daily  atorvastatin Oral Tab/Cap - Peds 40 milliGRAM(s) Oral daily  enoxaparin Injectable 40 milliGRAM(s) SubCutaneous daily  folic acid 1 milliGRAM(s) Oral daily  hydrochlorothiazide 12.5 milliGRAM(s) Oral daily  latanoprost 0.005% Ophthalmic Solution 1 Drop(s) Both EYES at bedtime  levothyroxine 175 MICROGram(s) Oral daily  loperamide 2 milliGRAM(s) Oral daily  omega-3-Acid Ethyl Esters 4 Gram(s) Oral daily  omeprazole 20 milliGRAM(s) Oral daily  sodium chloride 0.9%. 1000 milliLiter(s) (75 mL/Hr) IV Continuous <Continuous>  timolol 0.25% Solution 1 Drop(s) Both EYES daily    MEDICATIONS  (PRN):  acetaminophen   Tablet .. 650 milliGRAM(s) Oral every 6 hours PRN Temp greater or equal to 38.5C (101.3F)      LABS                          11.5   4.03  )-----------( 130      ( 28 Aug 2021 06:29 )             33.3         133<L>  |  100  |  9<L>  ----------------------------<  113<H>  4.1   |  22  |  1.0    Ca    8.1<L>      28 Aug 2021 06:29  Mg     1.8         TPro  5.6<L>  /  Alb  3.8  /  TBili  0.7  /  DBili  x   /  AST  30  /  ALT  16  /  AlkPhos  41        Urinalysis Basic - ( 26 Aug 2021 15:30 )    Color: Light Yellow / Appearance: Clear / S.016 / pH: x  Gluc: x / Ketone: Trace  / Bili: Negative / Urobili: <2 mg/dL   Blood: x / Protein: Trace / Nitrite: Negative   Leuk Esterase: Negative / RBC: x / WBC x   Sq Epi: x / Non Sq Epi: x / Bacteria: x        Lactate Trend        CAPILLARY BLOOD GLUCOSE      POCT Blood Glucose.: 174 mg/dL (27 Aug 2021 10:59)      GI PCR Panel, Stool (collected 21 @ 08:36)  Source: .Stool Feces  Final Report (21 @ 23:31):    GI PCR Results: NOT detected    *******Please Note:*******    GI panel PCR evaluates for:    Campylobacter, Plesiomonas shigelloides, Salmonella,    Vibrio, Yersinia enterocolitica, Enteroaggregative    Escherichia coli (EAEC), Enteropathogenic E.coli (EPEC),    Enterotoxigenic E. coli (ETEC) lt/st, Shiga-like    toxin-producing E. coli (STEC) stx1/stx2,    Shigella/ Enteroinvasive E. coli (EIEC), Cryptosporidium,    Cyclospora cayetanensis, Entamoeba histolytica,    Giardia lamblia, Adenovirus F 40/41, Astrovirus,    Norovirus GI/GII, Rotavirus A, Sapovirus    Culture - Blood (collected 21 @ 16:00)  Source: .Blood Blood  Preliminary Report (21 @ 02:02):    No growth to date.        RADIOLOGY & ADDITIONAL TESTS:    Imaging Personally Reviewed:  [ ] YES  [ ] NO    HEALTH ISSUES - PROBLEM Dx:          PHYSICAL EXAM:  GENERAL: NAD, well-developed.  HEAD:  Atraumatic, Normocephalic.  EYES: EOMI, PERRLA, conjunctiva and sclera clear.  NECK: Supple, No JVD.  CHEST/LUNG: Clear to auscultation bilaterally; No wheeze.  HEART: Regular rate and rhythm; S1 S2.   ABDOMEN: Soft, Nontender, Nondistended; Bowel sounds present.  EXTREMITIES:  2+ Peripheral Pulses, No clubbing, cyanosis, or edema.  PSYCH: AAOx3.  NEUROLOGY: non-focal.  SKIN: No rashes or lesions.

## 2021-08-28 NOTE — CHART NOTE - NSCHARTNOTEFT_GEN_A_CORE
Pt had an episode of loose stools overnight and was given imodium by night team. Pt did not have diarrhea today so far, if any episode today will c/s GI.

## 2021-08-29 ENCOUNTER — TRANSCRIPTION ENCOUNTER (OUTPATIENT)
Age: 66
End: 2021-08-29

## 2021-08-29 VITALS — SYSTOLIC BLOOD PRESSURE: 150 MMHG | HEART RATE: 66 BPM | DIASTOLIC BLOOD PRESSURE: 80 MMHG

## 2021-08-29 LAB
ALBUMIN SERPL ELPH-MCNC: 3.9 G/DL — SIGNIFICANT CHANGE UP (ref 3.5–5.2)
ALP SERPL-CCNC: 39 U/L — SIGNIFICANT CHANGE UP (ref 30–115)
ALT FLD-CCNC: 21 U/L — SIGNIFICANT CHANGE UP (ref 0–41)
ANION GAP SERPL CALC-SCNC: 8 MMOL/L — SIGNIFICANT CHANGE UP (ref 7–14)
AST SERPL-CCNC: 30 U/L — SIGNIFICANT CHANGE UP (ref 0–41)
BASOPHILS # BLD AUTO: 0.02 K/UL — SIGNIFICANT CHANGE UP (ref 0–0.2)
BASOPHILS NFR BLD AUTO: 0.5 % — SIGNIFICANT CHANGE UP (ref 0–1)
BILIRUB SERPL-MCNC: 0.5 MG/DL — SIGNIFICANT CHANGE UP (ref 0.2–1.2)
BUN SERPL-MCNC: 10 MG/DL — SIGNIFICANT CHANGE UP (ref 10–20)
CALCIUM SERPL-MCNC: 8.5 MG/DL — SIGNIFICANT CHANGE UP (ref 8.5–10.1)
CHLORIDE SERPL-SCNC: 100 MMOL/L — SIGNIFICANT CHANGE UP (ref 98–110)
CO2 SERPL-SCNC: 26 MMOL/L — SIGNIFICANT CHANGE UP (ref 17–32)
CREAT SERPL-MCNC: 0.9 MG/DL — SIGNIFICANT CHANGE UP (ref 0.7–1.5)
EOSINOPHIL # BLD AUTO: 0.13 K/UL — SIGNIFICANT CHANGE UP (ref 0–0.7)
EOSINOPHIL NFR BLD AUTO: 3.3 % — SIGNIFICANT CHANGE UP (ref 0–8)
GLUCOSE SERPL-MCNC: 131 MG/DL — HIGH (ref 70–99)
HCT VFR BLD CALC: 31.7 % — LOW (ref 42–52)
HGB BLD-MCNC: 11.1 G/DL — LOW (ref 14–18)
IMM GRANULOCYTES NFR BLD AUTO: 0.8 % — HIGH (ref 0.1–0.3)
LYMPHOCYTES # BLD AUTO: 1.05 K/UL — LOW (ref 1.2–3.4)
LYMPHOCYTES # BLD AUTO: 26.8 % — SIGNIFICANT CHANGE UP (ref 20.5–51.1)
MAGNESIUM SERPL-MCNC: 1.5 MG/DL — LOW (ref 1.8–2.4)
MCHC RBC-ENTMCNC: 31.3 PG — HIGH (ref 27–31)
MCHC RBC-ENTMCNC: 35 G/DL — SIGNIFICANT CHANGE UP (ref 32–37)
MCV RBC AUTO: 89.3 FL — SIGNIFICANT CHANGE UP (ref 80–94)
MONOCYTES # BLD AUTO: 0.33 K/UL — SIGNIFICANT CHANGE UP (ref 0.1–0.6)
MONOCYTES NFR BLD AUTO: 8.4 % — SIGNIFICANT CHANGE UP (ref 1.7–9.3)
NEUTROPHILS # BLD AUTO: 2.36 K/UL — SIGNIFICANT CHANGE UP (ref 1.4–6.5)
NEUTROPHILS NFR BLD AUTO: 60.2 % — SIGNIFICANT CHANGE UP (ref 42.2–75.2)
NRBC # BLD: 0 /100 WBCS — SIGNIFICANT CHANGE UP (ref 0–0)
PLATELET # BLD AUTO: 149 K/UL — SIGNIFICANT CHANGE UP (ref 130–400)
POTASSIUM SERPL-MCNC: 4.1 MMOL/L — SIGNIFICANT CHANGE UP (ref 3.5–5)
POTASSIUM SERPL-SCNC: 4.1 MMOL/L — SIGNIFICANT CHANGE UP (ref 3.5–5)
PROT SERPL-MCNC: 5.8 G/DL — LOW (ref 6–8)
RBC # BLD: 3.55 M/UL — LOW (ref 4.7–6.1)
RBC # FLD: 12.1 % — SIGNIFICANT CHANGE UP (ref 11.5–14.5)
SODIUM SERPL-SCNC: 134 MMOL/L — LOW (ref 135–146)
WBC # BLD: 3.92 K/UL — LOW (ref 4.8–10.8)
WBC # FLD AUTO: 3.92 K/UL — LOW (ref 4.8–10.8)

## 2021-08-29 RX ADMIN — Medication 1 DROP(S): at 11:28

## 2021-08-29 RX ADMIN — Medication 4 GRAM(S): at 11:24

## 2021-08-29 RX ADMIN — Medication 1 MILLIGRAM(S): at 11:22

## 2021-08-29 RX ADMIN — OMEPRAZOLE 20 MILLIGRAM(S): 10 CAPSULE, DELAYED RELEASE ORAL at 11:24

## 2021-08-29 RX ADMIN — Medication 81 MILLIGRAM(S): at 11:22

## 2021-08-29 RX ADMIN — ATORVASTATIN CALCIUM 40 MILLIGRAM(S): 80 TABLET, FILM COATED ORAL at 11:22

## 2021-08-29 RX ADMIN — Medication 175 MICROGRAM(S): at 05:36

## 2021-08-29 RX ADMIN — Medication 12.5 MILLIGRAM(S): at 05:36

## 2021-08-29 NOTE — CHART NOTE - NSCHARTNOTEFT_GEN_A_CORE
<<<RESIDENT DISCHARGE NOTE>>>     MELBA MOON  MRN-[id number]    VITAL SIGNS:  T(F): 96.5 (08-29-21 @ 05:05), Max: 97.4 (08-28-21 @ 20:36)  HR: 79 (08-29-21 @ 05:05)  BP: 132/80 (08-29-21 @ 05:05)  SpO2: 98% (08-29-21 @ 07:50)      PHYSICAL EXAMINATION:   General: NAD  Head & Neck: Supple, atraumatic  Pulmonary: CTAB  Cardiovascular: RRR, no rubs, gallops, murmurs  Gastrointestinal/Abdomen & Pelvis: soft, tender, bowel sounds present  Neurologic/Motor: non-focal    TEST RESULTS:                        11.1   3.92  )-----------( 149      ( 29 Aug 2021 07:10 )             31.7       08-29    134<L>  |  100  |  10  ----------------------------<  131<H>  4.1   |  26  |  0.9    Ca    8.5      29 Aug 2021 07:10  Mg     1.5     08-29    TPro  5.8<L>  /  Alb  3.9  /  TBili  0.5  /  DBili  x   /  AST  30  /  ALT  21  /  AlkPhos  39  08-29      FINAL DISCHARGE INTERVIEW:  Resident(s) Present: (Name:______Rober Khan_______), RN Present: (Name:  ___________)    DISCHARGE MEDICATION RECONCILIATION  reviewed with Attending (Name:___________)    DISPOSITION:   [  ] Home,    [  ] Home with Visiting Nursing Services,   [    ]  SNF/ NH,    [   ] Acute Rehab (4A),   [   ] Other (Specify:_________)

## 2021-08-29 NOTE — DISCHARGE NOTE NURSING/CASE MANAGEMENT/SOCIAL WORK - PATIENT PORTAL LINK FT
You can access the FollowMyHealth Patient Portal offered by [facility] by registering at the following website: [email or web address]. By joining [facility]’s FollowMyHealth portal, you will also be able to view your health information using other applications (apps) compatible with our system.

## 2021-08-29 NOTE — DISCHARGE NOTE PROVIDER - NSDCMRMEDTOKEN_GEN_ALL_CORE_FT
Aspir 81 oral delayed release tablet: 1 tab(s) orally once a day  Benicar HCT 40 mg-12.5 mg oral tablet: 1 tab(s) orally once a day  folic acid 1 mg oral tablet: 1 tab(s) orally once a day  Lipitor 40 mg oral tablet: 1 tab(s) orally once a day  Lovaza: 4000 milligram(s) orally once a day  Lumigan 0.01% ophthalmic solution: 1 drop(s) to each affected eye once a day (in the evening)  Synthroid 175 mcg (0.175 mg) oral tablet: 1 tab(s) orally once a day  timolol hemihydrate 0.5% ophthalmic solution: 1 drop(s) to each affected eye 2 times a day  Vitamin D2 50 mcg (2000 intl units) oral capsule: 1 cap(s) orally once a day

## 2021-08-29 NOTE — DISCHARGE NOTE PROVIDER - NSDCCPCAREPLAN_GEN_ALL_CORE_FT
PRINCIPAL DISCHARGE DIAGNOSIS  Diagnosis: Syncope  Assessment and Plan of Treatment: Syncope  Syncope is when you temporarily lose consciousness, also called fainting or passing out. It is caused by a sudden decrease in blood flow to the brain. Even though most causes of syncope are not dangerous, syncope can possibly be a sign of a serious medical problem. Signs that you may be about to faint include feeling dizzy, lightheaded, nausea, visual changes, or cold/clammy skin. Do not drive, operate heavy machinery, or play sports until your health care provider says it is okay.  SEEK IMMEDIATE MEDICAL CARE IF YOU HAVE ANY OF THE FOLLOWING SYMPTOMS: severe headache, pain in your chest/abdomen/back, bleeding from your mouth or rectum, palpitations, shortness of breath, pain with breathing, seizure, confusion, or trouble walking.

## 2021-08-29 NOTE — DISCHARGE NOTE PROVIDER - HOSPITAL COURSE
66 Y M with PMH of HLD, B/L carotid stenosis, HTN, hypothyroidism presents with CC of syncope. Patient after arrival to the ED had another syncopal episode, but this time there was associated twitching of the right upper and left lower extremities. Patient became diaphoretic, per chart, HR did drop to 50, sinus, went up after less than a minute to the 60s.   VS wnl in the ED. Labs significant for Na of 130, K of 3.4 and Mag of 1.1. CTH showed No acute intracranial hemorrhage, mass-effect or midline shift.    A/P:   Recurrent syncope: likely vasovagal.   Orthostatic changes negative,   Head CT normal.   Echo showed normal LVEF, no significant valvular disease.   Telemetry showed no arrhythmia.   EEG normal.   cardiology recommended no need for loop recorder.   Benicar held,  continue IV fluid until diarrhea improves, encourage oral hydration.    Acute diarrhea:   Likely gastroenteritis.  C.diff negative. GI consulted.     Hypokalemia  Hypomagnesemia  Hyponatremia:   Hold HCTZ. Replcae.    HTN  Hold Benicar and HCTZ    Hypothyroidism: synthroid,      DVT prophylaxis lovenox    #Progress Note Handoff:  Pending (specify):  improving diarrhea, GI consult.   Family discussion:  Disposition: Home.   66 Y M with PMH of HLD, B/L carotid stenosis, HTN, hypothyroidism presented with CC of syncope. Patient after arrival to the ED had another syncopal episode, but this time there was associated twitching of the right upper and left lower extremities. Patient became diaphoretic, per chart, HR did drop to 50, sinus, went up after less than a minute to the 60s.   VS wnl in the ED. Labs significant for Na of 130, K of 3.4 and Mag of 1.1. CTH showed No acute intracranial hemorrhage, mass-effect or midline shift.  Orthostatic changes negative, Head CT normal, Echo showed normal LVEF, no significant valvular disease., Telemetry showed no arrhythmia, EEG normal  Syncope was likely vasovagal and pt is being sent home, pt is medically stable for discharge

## 2021-08-29 NOTE — PROGRESS NOTE ADULT - SUBJECTIVE AND OBJECTIVE BOX
Patient was seen and examined. Spoke with RN and wife and HO. Chart reviewed.  No events overnight. No diarrhea in two days- feels fine, insisting on discharge today  Vital Signs Last 24 Hrs  T(F): 96.5 (29 Aug 2021 05:05), Max: 97.4 (28 Aug 2021 20:36)  HR: 79 (29 Aug 2021 05:05) (65 - 79)  BP: 132/80 (29 Aug 2021 05:05) (132/80 - 135/77)  SpO2: 98% (29 Aug 2021 07:50) (98% - 98%)  MEDICATIONS  (STANDING):  aspirin enteric coated 81 milliGRAM(s) Oral daily  atorvastatin Oral Tab/Cap - Peds 40 milliGRAM(s) Oral daily  enoxaparin Injectable 40 milliGRAM(s) SubCutaneous daily  folic acid 1 milliGRAM(s) Oral daily  hydrochlorothiazide 12.5 milliGRAM(s) Oral daily  latanoprost 0.005% Ophthalmic Solution 1 Drop(s) Both EYES at bedtime  levothyroxine 175 MICROGram(s) Oral daily  loperamide 2 milliGRAM(s) Oral daily  omega-3-Acid Ethyl Esters 4 Gram(s) Oral daily  omeprazole 20 milliGRAM(s) Oral daily  sodium chloride 0.9%. 1000 milliLiter(s) (75 mL/Hr) IV Continuous <Continuous>  timolol 0.25% Solution 1 Drop(s) Both EYES daily    MEDICATIONS  (PRN):  acetaminophen   Tablet .. 650 milliGRAM(s) Oral every 6 hours PRN Temp greater or equal to 38.5C (101.3F)    Labs:                        11.1   3.92  )-----------( 149      ( 29 Aug 2021 07:10 )             31.7                         11.5   4.03  )-----------( 130      ( 28 Aug 2021 06:29 )             33.3     29 Aug 2021 07:10    134    |  100    |  10     ----------------------------<  131    4.1     |  26     |  0.9    28 Aug 2021 06:29    133    |  100    |  9      ----------------------------<  113    4.1     |  22     |  1.0      Ca    8.5        29 Aug 2021 07:10  Ca    8.1        28 Aug 2021 06:29  Mg     1.5       29 Aug 2021 07:10  Mg     1.8       28 Aug 2021 06:29    TPro  5.8    /  Alb  3.9    /  TBili  0.5    /  DBili  x      /  AST  30     /  ALT  21     /  AlkPhos  39     29 Aug 2021 07:10  TPro  5.6    /  Alb  3.8    /  TBili  0.7    /  DBili  x      /  AST  30     /  ALT  16     /  AlkPhos  41     28 Aug 2021 06:29          GI PCR Panel, Stool (collected 27 Aug 2021 08:36)  Source: .Stool Feces  Final Report (27 Aug 2021 23:31):    GI PCR Results: NOT detected    *******Please Note:*******    GI panel PCR evaluates for:    Campylobacter, Plesiomonas shigelloides, Salmonella,    Vibrio, Yersinia enterocolitica, Enteroaggregative    Escherichia coli (EAEC), Enteropathogenic E.coli (EPEC),    Enterotoxigenic E. coli (ETEC) lt/st, Shiga-like    toxin-producing E. coli (STEC) stx1/stx2,    Shigella/ Enteroinvasive E. coli (EIEC), Cryptosporidium,    Cyclospora cayetanensis, Entamoeba histolytica,    Giardia lamblia, Adenovirus F 40/41, Astrovirus,    Norovirus GI/GII, Rotavirus A, Sapovirus    Culture - Blood (collected 27 Aug 2021 06:47)  Source: .Blood None  Preliminary Report (28 Aug 2021 19:02):    No growth to date.    Culture - Blood (collected 26 Aug 2021 16:00)  Source: .Blood Blood  Preliminary Report (28 Aug 2021 02:02):    No growth to date.      General: comfortable, NAD  Neurology: A&Ox3, nonfocal      A/P:  67 YO man with HLD, B/L carotid stenosis, HTN, hypothyroidism admitted with recurrent syncope     cardio and neuro workup unremarkable    cleared for DC by cardio    outpt GI f/u with Dr Mo    DC home today    seizure/fall/syncope precautions- pt aware      DVT prophylaxis  Decubitus prevention- all measures as per RN protocol  Please call or text me with any questions or updates

## 2021-09-01 DIAGNOSIS — Z20.822 CONTACT WITH AND (SUSPECTED) EXPOSURE TO COVID-19: ICD-10-CM

## 2021-09-01 DIAGNOSIS — H93.19 TINNITUS, UNSPECIFIED EAR: ICD-10-CM

## 2021-09-01 DIAGNOSIS — I10 ESSENTIAL (PRIMARY) HYPERTENSION: ICD-10-CM

## 2021-09-01 DIAGNOSIS — Z88.0 ALLERGY STATUS TO PENICILLIN: ICD-10-CM

## 2021-09-01 DIAGNOSIS — E87.6 HYPOKALEMIA: ICD-10-CM

## 2021-09-01 DIAGNOSIS — R55 SYNCOPE AND COLLAPSE: ICD-10-CM

## 2021-09-01 DIAGNOSIS — E03.9 HYPOTHYROIDISM, UNSPECIFIED: ICD-10-CM

## 2021-09-01 DIAGNOSIS — I65.23 OCCLUSION AND STENOSIS OF BILATERAL CAROTID ARTERIES: ICD-10-CM

## 2021-09-01 DIAGNOSIS — E87.1 HYPO-OSMOLALITY AND HYPONATREMIA: ICD-10-CM

## 2021-09-01 DIAGNOSIS — E83.42 HYPOMAGNESEMIA: ICD-10-CM

## 2021-09-01 DIAGNOSIS — E78.5 HYPERLIPIDEMIA, UNSPECIFIED: ICD-10-CM

## 2021-09-01 DIAGNOSIS — K52.9 NONINFECTIVE GASTROENTERITIS AND COLITIS, UNSPECIFIED: ICD-10-CM

## 2022-02-02 NOTE — ASU PATIENT PROFILE, ADULT - NS PRO INFO GIVEN TO
family/wife Minocycline Counseling: Patient advised regarding possible photosensitivity and discoloration of the teeth, skin, lips, tongue and gums.  Patient instructed to avoid sunlight, if possible.  When exposed to sunlight, patients should wear protective clothing, sunglasses, and sunscreen.  The patient was instructed to call the office immediately if the following severe adverse effects occur:  hearing changes, easy bruising/bleeding, severe headache, or vision changes.  The patient verbalized understanding of the proper use and possible adverse effects of minocycline.  All of the patient's questions and concerns were addressed.

## 2022-04-13 NOTE — PATIENT PROFILE ADULT - BRAND OF COVID-19 VACCINATION
Fax received from pharmacy  
[facility]    Cardiology Consultation     Date of Admission:  5/31/2019    Assessment & Plan   Sree Kumar is a 83 year old male who was admitted on 5/31/2019.    1.  Non-ST elevation myocardial infarction  At this time, patient presents with rest pain is consistent with angina.  He has slight increase in troponin consistent with acute coronary syndrome.  Would recommend IV heparin with transfer to inpatient service.  Given these symptoms and presentation, I would recommend coronary angiography.Risks and benefits of left heart catheterization and coronary angiogram were discussed with the patient in detail. 0.1-0.3% (for diagnostic angio) and 1-2% (for PCI)  risk of stroke, MI, death, emergent bypass for diagnostic angio, risk of contrast induced allergic reaction, renal dysfunction, vascular complications were discussed. Pros and cons of bare metal Vs drug eluting stents was discussed. Patient understands and wishes to proceed with it.  Had a stress echocardiogram in March of this year which showed apical ischemia.  In 2004, he had a coronary angiography which showed severe left main disease which was stented with a Taxus stent.  At that time the venous graft to the RCA and OM were patent.  There was severe small circumflex disease.  Native right coronary artery was occluded.  There was diffuse apical LAD disease which could explain the apical ischemia.  Irrespective of the results of the angiography, he likely needs long-term Plavix because of previous left main stent.    2.  Hypertension  Still blood pressure is elevated.  He does not tolerate lisinopril for possible depression.  This is an unusual side effect.  We will try losartan 50 mg daily.  Continue beta-blockers    3.  Hyperlipidemia, not on statin because of previous intolerance  Should attempt another proposal to the insurance company to get his PCSK9 inhibitors approved  May be a candidate for Michelle Benitez 
MD    Primary Care Physician   Boris Hoskins    Reason for Consult   Reason for consult: I was asked by hospitalist to evaluate this patient for unstable angina.    History of Present Illness   Sree Kumar is a 83 year old  male with medical history of Coronary artery disease status post CABG, symptomatic PACs and PVCs, hyperlipidemia, hypertension who presented to emergency room with episode of chest pain.  He woke up at 3 AM and had mild to moderate chest tightness across his chest radiating to the back.  This was concerning to him and he called paramedics and the nitroglycerin was given which relieved the pain.  He has been having intermittent upper shoulder and upper back numbness and slight discomfort off and on.  He notices that when he is taking his garbage out.  He also had that on a recent stress test.  The stress test that showed some apical ischemia but was managed medically.  His initial troponin is normal with a second troponin is mildly elevated consistent with non-ST elevation myocardial infarction    He is a patient of my colleague Dr. Hung.  He has past medical history of coronary artery bypass surgery with saphenous venous graft to RCA and a venous graft to the OM.  He apparently also has had a left main stent in 2004.  The bypass surgery was in 1994.    Patient lives alone independently, In the ER blood pressure 175/85.  EKG sinus rhythm with first-degree AV block, no acute ST-T wave changes.      He also has history of intermittent PVCs which gets worse when he does not use a CPAP machine.  Atenolol helps with that.    He is not on statins because of previous intolerance.  Apparently, he did not qualify for PCSK9 inhibitors as his insurance would not cover it.  Patient Active Problem List   Diagnosis     Coronary artery disease involving native coronary artery of native heart without angina pectoris     Diastasis recti     Benign essential hypertension     BPH (benign prostatic hyperplasia) 
    Mixed hyperlipidemia      CABG (coronary artery bypass graft)     Palpitations     MARIELLE (obstructive sleep apnea)       Past Medical History   I have reviewed this patient's medical history and updated it with pertinent information if needed.   Past Medical History:   Diagnosis Date     BPH (benign prostatic hyperplasia)      CAD (coronary artery disease)     6/10/2014 Stress Echo - normal, EF 55-60%, frequent PVCs noted in recovery     Hx of CABG     1994 grafts CFX,RCA     Hyperlipidaemia      Mixed hyperlipidemia 1/19/2015     Myocardial infarct (H)     1994     Palpitation      Palpitations      Unspecified essential hypertension        Past Surgical History   I have reviewed this patient's surgical history and updated it with pertinent information if needed.  Past Surgical History:   Procedure Laterality Date     C CABG, ARTERY-VEIN, THREE      1994     CORONARY ANGIOGRAPHY ADULT ORDER  3/22/2004    SVG to first OM, SVG to RCA       Prior to Admission Medications   Prior to Admission Medications   Prescriptions Last Dose Informant Patient Reported? Taking?   Ascorbic Acid (VITAMIN C PO)   Yes No   Sig: Take 1,000 mg by mouth daily   Cholecalciferol (VITAMIN D3) 5000 UNITS TABS   Yes No   Sig: Take by mouth daily   Multiple Vitamins-Minerals (MULTIVITAMIN PO)   Yes No   Sig: Take by mouth daily   amLODIPine (NORVASC) 5 MG tablet   No No   Sig: Take 1 tablet (5 mg) by mouth daily   atenolol (TENORMIN) 25 MG tablet   No No   Sig: Take 0.75 tablets (18.75 mg) by mouth daily      Facility-Administered Medications: None     Current Facility-Administered Medications   Medication Dose Route Frequency     amLODIPine  5 mg Oral Daily     [START ON 6/1/2019] aspirin  81 mg Oral Daily     zz extemporaneous template  18.75 mg Oral Daily     sodium chloride (PF)  3 mL Intracatheter Q8H     Current Facility-Administered Medications   Medication Last Rate     Allergies   Allergies   Allergen Reactions     Hmg-Coa-R 
"Inhibitors Muscle Pain (Myalgia)       Social History    reports that he has never smoked. He has never used smokeless tobacco. He reports that he does not drink alcohol or use drugs.    Family History   Family History   Problem Relation Age of Onset     Myocardial Infarction Mother 62        MI     Unknown/Adopted Father        Review of Systems   The comprehensive 10 point Review of Systems is negative other than noted in the HPI or here.     Physical Exam   Vital Signs with Ranges  Temp:  [95.9  F (35.5  C)-97.9  F (36.6  C)] 96  F (35.6  C)  Pulse:  [57-65] 57  Heart Rate:  [58] 58  Resp:  [14-16] 14  BP: (164-179)/(69-85) 164/71  SpO2:  [96 %-97 %] 96 %  Wt Readings from Last 4 Encounters:   05/31/19 74.1 kg (163 lb 4.8 oz)   04/18/19 74.4 kg (164 lb)   03/21/19 74.9 kg (165 lb 3.2 oz)   03/14/19 72.1 kg (159 lb)     No intake/output data recorded.      Vitals: /71 (BP Location: Right arm)   Pulse 57   Temp 96  F (35.6  C) (Oral)   Resp 14   Ht 1.753 m (5' 9\")   Wt 74.1 kg (163 lb 4.8 oz)   SpO2 96%   BMI 24.12 kg/m      Constitutional:   alert   Eyes:   extra-ocular muscles intact   ENT:   normocepalic, without obvious abnormality   Neck:   no jugular venous distension   Hematologic / Lymphatic:   no cervical lymphadenopathy   Back:   symmetric   Lungs:   clear to auscultation   Cardiovascular:   normal S1 and S2, S4 present and no murmur noted   Abdomen:   soft and non-distended   Neurologic:   Motor Exam:  moves all extremities well and symmetrically   Neuropsychiatric:   Orientation: oriented to self, place, time and situation   Skin:   no lesions       Recent Labs   Lab 05/31/19  0439   TROPI <0.015       Recent Labs   Lab 05/31/19 0439   WBC 11.6*   HGB 13.7   MCV 91         POTASSIUM 3.5   CHLORIDE 108   CO2 28   BUN 22   CR 0.98   GFRESTIMATED 71   GFRESTBLACK 83   ANIONGAP 5   LANE 8.9   *   TROPI <0.015     Recent Labs   Lab Test 03/19/19  0836 09/07/17  0859  "
05/12/15  0727 05/07/14  0000   CHOL 177 156   < > 112  --    HDL 57 52   < > 37* 45   * 94   < > 63* 91   TRIG 40 48   < > 58 47   CHOLHDLRATIO  --   --   --  3.0 3.2    < > = values in this interval not displayed.     Recent Labs   Lab 05/31/19  0439   WBC 11.6*   HGB 13.7   HCT 40.1   MCV 91        No results for input(s): PH, PHV, PO2, PO2V, SAT, PCO2, PCO2V, HCO3, HCO3V in the last 168 hours.  No results for input(s): NTBNPI, NTBNP in the last 168 hours.  No results for input(s): DD in the last 168 hours.  No results for input(s): SED, CRP in the last 168 hours.  Recent Labs   Lab 05/31/19  0439        No results for input(s): TSH in the last 168 hours.  No results for input(s): COLOR, APPEARANCE, URINEGLC, URINEBILI, URINEKETONE, SG, UBLD, URINEPH, PROTEIN, UROBILINOGEN, NITRITE, LEUKEST, RBCU, WBCU in the last 168 hours.    Imaging:  Recent Results (from the past 48 hour(s))   XR Chest 2 Views    Narrative    XR CHEST 2 VW  5/31/2019 4:54 AM     HISTORY: Chest pain.    COMPARISON: None.    FINDINGS: Sternal wires and mediastinal clips. No pneumothorax. The  heart size is normal. The thoracic aorta is calcified and tortuous.  There is a band of scar or atelectasis in the left midlung. The lungs  are otherwise clear. No pleural effusion.      Impression    IMPRESSION: No acute abnormality.    ADRIAN HERRING MD       Echo:  No results found for this or any previous visit (from the past 4320 hour(s)).           
[employer] dose 1 and 2

## 2023-11-17 NOTE — ED ADULT NURSE NOTE - SUICIDE SCREENING QUESTION 2
No

## 2024-04-06 NOTE — ED ADULT TRIAGE NOTE - PAIN: PRESENCE, MLM
I was present during the key portion of the procedure.  2 attempts performed, successful on 2nd attempt.  Pt ashwin proc well no complication.
denies pain/discomfort

## 2024-07-29 NOTE — ASU PREOP CHECKLIST - BSA (M2)
[facility]/pharmacy #[id number] - [place], [place] - [address] -  [phone] - F [phone]     doxycycline hyclate (VIBRA-TABS) 100 MG tablet     [place] [facility] is unable to send these to the patient and told her to have it sent to  the local pharmacy. She has not received the medication.   1.94

## 2025-02-19 NOTE — H&P ADULT - ATTENDING COMMENTS
----- Message from Junior Schmidt MD sent at 2/19/2025  1:01 PM CST -----  Call patient   She is both iron and B12 deficient.  Can she tolerate oral iron?  If so, 325mg bid.  Also, set her up with monthly B12 injections.   ----- Message -----  From: Tariq, [employer] [facility]  Sent: 2/19/2025   9:53 AM CST  To: Junior Schmidt MD     65 y/o M with pmh of HTN, DLD, Dyslipidemia, GERD, hypothyroid presents with c/o Unresponsiveness for less than a minute after using marijuana; also recently had URI and taking zithromax    Pt seen and examined in ER- spoke with wife (RN) - now at baseline, comfortable    chart reviewed- agree with above    had recent stress echo    cardio eval Dr Steele    tele    consider loop recorder-     pt aware of need to stop marijuana    plan as per cardiology